# Patient Record
Sex: FEMALE | Race: WHITE | NOT HISPANIC OR LATINO | Employment: FULL TIME | URBAN - NONMETROPOLITAN AREA
[De-identification: names, ages, dates, MRNs, and addresses within clinical notes are randomized per-mention and may not be internally consistent; named-entity substitution may affect disease eponyms.]

---

## 2018-02-23 ENCOUNTER — OFFICE VISIT CONVERTED (OUTPATIENT)
Dept: FAMILY MEDICINE CLINIC | Age: 48
End: 2018-02-23
Attending: NURSE PRACTITIONER

## 2018-04-25 ENCOUNTER — OFFICE VISIT CONVERTED (OUTPATIENT)
Dept: FAMILY MEDICINE CLINIC | Age: 48
End: 2018-04-25
Attending: NURSE PRACTITIONER

## 2018-11-26 ENCOUNTER — OFFICE VISIT CONVERTED (OUTPATIENT)
Dept: FAMILY MEDICINE CLINIC | Age: 48
End: 2018-11-26
Attending: NURSE PRACTITIONER

## 2019-01-09 ENCOUNTER — OFFICE VISIT CONVERTED (OUTPATIENT)
Dept: FAMILY MEDICINE CLINIC | Age: 49
End: 2019-01-09
Attending: NURSE PRACTITIONER

## 2019-10-22 ENCOUNTER — OFFICE VISIT CONVERTED (OUTPATIENT)
Dept: FAMILY MEDICINE CLINIC | Age: 49
End: 2019-10-22
Attending: NURSE PRACTITIONER

## 2019-10-22 ENCOUNTER — HOSPITAL ENCOUNTER (OUTPATIENT)
Dept: OTHER | Facility: HOSPITAL | Age: 49
Discharge: HOME | End: 2019-10-22
Attending: NURSE PRACTITIONER

## 2019-10-22 LAB
ALBUMIN SERPL-MCNC: 4.6 G/DL (ref 3.5–5)
ALBUMIN/GLOB SERPL: 1.6 {RATIO} (ref 1.4–2.6)
ALP SERPL-CCNC: 74 U/L (ref 42–98)
ALT SERPL-CCNC: 19 U/L (ref 10–40)
ANION GAP SERPL CALC-SCNC: 20 MMOL/L (ref 8–19)
AST SERPL-CCNC: 20 U/L (ref 15–50)
BILIRUB SERPL-MCNC: 0.19 MG/DL (ref 0.2–1.3)
BUN SERPL-MCNC: 10 MG/DL (ref 5–25)
BUN/CREAT SERPL: 14 {RATIO} (ref 6–20)
CALCIUM SERPL-MCNC: 9.3 MG/DL (ref 8.7–10.4)
CHLORIDE SERPL-SCNC: 102 MMOL/L (ref 99–111)
CHOLEST SERPL-MCNC: 231 MG/DL (ref 107–200)
CHOLEST/HDLC SERPL: 3.6 {RATIO} (ref 3–6)
CONV CO2: 23 MMOL/L (ref 22–32)
CONV TOTAL PROTEIN: 7.5 G/DL (ref 6.3–8.2)
CREAT UR-MCNC: 0.72 MG/DL (ref 0.5–0.9)
GFR SERPLBLD BASED ON 1.73 SQ M-ARVRAT: >60 ML/MIN/{1.73_M2}
GLOBULIN UR ELPH-MCNC: 2.9 G/DL (ref 2–3.5)
GLUCOSE SERPL-MCNC: 93 MG/DL (ref 65–99)
HDLC SERPL-MCNC: 65 MG/DL (ref 40–60)
LDLC SERPL CALC-MCNC: 144 MG/DL (ref 70–100)
OSMOLALITY SERPL CALC.SUM OF ELEC: 291 MOSM/KG (ref 273–304)
POTASSIUM SERPL-SCNC: 4.4 MMOL/L (ref 3.5–5.3)
SODIUM SERPL-SCNC: 141 MMOL/L (ref 135–147)
TRIGL SERPL-MCNC: 112 MG/DL (ref 40–150)
VLDLC SERPL-MCNC: 22 MG/DL (ref 5–37)

## 2019-11-27 ENCOUNTER — HOSPITAL ENCOUNTER (OUTPATIENT)
Dept: OTHER | Facility: HOSPITAL | Age: 49
Discharge: HOME | End: 2019-11-27
Attending: NURSE PRACTITIONER

## 2019-11-27 ENCOUNTER — OFFICE VISIT CONVERTED (OUTPATIENT)
Dept: FAMILY MEDICINE CLINIC | Age: 49
End: 2019-11-27
Attending: NURSE PRACTITIONER

## 2019-11-29 LAB — BACTERIA SPEC AEROBE CULT: NORMAL

## 2020-02-12 ENCOUNTER — OFFICE VISIT CONVERTED (OUTPATIENT)
Dept: FAMILY MEDICINE CLINIC | Age: 50
End: 2020-02-12
Attending: NURSE PRACTITIONER

## 2020-06-24 ENCOUNTER — HOSPITAL ENCOUNTER (OUTPATIENT)
Dept: OTHER | Facility: HOSPITAL | Age: 50
Discharge: HOME | End: 2020-06-24
Attending: NURSE PRACTITIONER

## 2020-06-24 ENCOUNTER — OFFICE VISIT CONVERTED (OUTPATIENT)
Dept: FAMILY MEDICINE CLINIC | Age: 50
End: 2020-06-24
Attending: NURSE PRACTITIONER

## 2020-06-24 LAB
ALBUMIN SERPL-MCNC: 4.2 G/DL (ref 3.5–5)
ALBUMIN/GLOB SERPL: 1.8 {RATIO} (ref 1.4–2.6)
ALP SERPL-CCNC: 60 U/L (ref 42–98)
ALT SERPL-CCNC: 14 U/L (ref 10–40)
ANION GAP SERPL CALC-SCNC: 16 MMOL/L (ref 8–19)
AST SERPL-CCNC: 13 U/L (ref 15–50)
BILIRUB SERPL-MCNC: 0.22 MG/DL (ref 0.2–1.3)
BUN SERPL-MCNC: 12 MG/DL (ref 5–25)
BUN/CREAT SERPL: 17 {RATIO} (ref 6–20)
CALCIUM SERPL-MCNC: 9.1 MG/DL (ref 8.7–10.4)
CHLORIDE SERPL-SCNC: 103 MMOL/L (ref 99–111)
CONV CO2: 25 MMOL/L (ref 22–32)
CONV TOTAL PROTEIN: 6.5 G/DL (ref 6.3–8.2)
CREAT UR-MCNC: 0.72 MG/DL (ref 0.5–0.9)
GFR SERPLBLD BASED ON 1.73 SQ M-ARVRAT: >60 ML/MIN/{1.73_M2}
GLOBULIN UR ELPH-MCNC: 2.3 G/DL (ref 2–3.5)
GLUCOSE SERPL-MCNC: 115 MG/DL (ref 65–99)
OSMOLALITY SERPL CALC.SUM OF ELEC: 291 MOSM/KG (ref 273–304)
POTASSIUM SERPL-SCNC: 4.2 MMOL/L (ref 3.5–5.3)
SODIUM SERPL-SCNC: 140 MMOL/L (ref 135–147)
TSH SERPL-ACNC: 1.06 M[IU]/L (ref 0.27–4.2)

## 2020-06-29 ENCOUNTER — HOSPITAL ENCOUNTER (OUTPATIENT)
Dept: OTHER | Facility: HOSPITAL | Age: 50
Discharge: HOME | End: 2020-06-29
Attending: NURSE PRACTITIONER

## 2021-03-09 ENCOUNTER — OFFICE VISIT CONVERTED (OUTPATIENT)
Dept: FAMILY MEDICINE CLINIC | Age: 51
End: 2021-03-09
Attending: NURSE PRACTITIONER

## 2021-05-18 NOTE — PROGRESS NOTES
Kiley Valentine BARRIE 1970     Office/Outpatient Visit    Visit Date: Wed, Apr 25, 2018 04:01 pm    Provider: Pia Pedraza N.P. (Assistant: Madalyn Pedraza MA)    Location: [facility]        Electronically signed by Pia Pedraza N.P. on  04/25/2018 05:16:48 PM                             SUBJECTIVE:        CC:     Ms. Valentine is a 47 year old White female.  Patient is here for routine check up.;         HPI:         Ms. Valentine presents with hypertension.  Her current cardiac medication regimen includes an ACE inhibitor ( Zestril ).  Ms. Valentine does not check her blood pressure other than at her clinic appointments.  She is tolerating the medication well without side effects.  Compliance with treatment has been good; she takes her medication as directed, maintains her diet and exercise regimen, and follows up as directed.      ROS:     CONSTITUTIONAL:  Negative for chills, fatigue, fever, and weight change.      CARDIOVASCULAR:  Negative for chest pain, palpitations, tachycardia, orthopnea, and edema.      RESPIRATORY:  Negative for cough, dyspnea, and hemoptysis.      NEUROLOGICAL:  Negative for dizziness, headaches, paresthesias, and weakness.          PM/FM/SH:     Last Reviewed on 4/25/2018 04:36 PM by Pia Pedraza    Past Medical History:                 PAST MEDICAL HISTORY             GYNECOLOGICAL HISTORY:    Contraception: S/P tubal ligation;         Surgical History:         Tubal Ligation Procedures: Prior gynecologic procedures include LEEP procedure 2000.      Hysterectomy: 8-9-16; lap hysterectomy, bilateral tubes/cystoscopy /benign/fibroids;     Procedures: hysteroscopy/endometrial biopsy 4-2016         Family History:         Positive for Hypertension ( father; pat. GM ).      Positive for COPD ( pat. GF ).  Father:    Positive for Hypertension;     Mother:    Positive for Hypertension;     ; Positive for Type 2 Diabetes;     Paternal Grandfather: Cause of death was COPD      Paternal Grandmother:    Positive for Hypertension;     Maternal Grandfather: Medical history unknown     Maternal Grandmother: Medical history unknown Father: Hypertension;  Cerebrovascular Accident     Mother: Hypertension;  Type 2 Diabetes     Paternal Grandfather: COPD     Paternal Grandmother: Hypertension         Social History:     Occupation: [employer]     Marital Status:      Children: 3 children     Exercise: Primary form of exercise is walking.   Frequency is 5 days per week.          Tobacco/Alcohol/Supplements:     Last Reviewed on 4/25/2018 04:04 PM by Madalyn Pedraza    Tobacco: She has a past history of cigarette smoking; quit date:  2007.          Alcohol: Frequency:    was drinking daily but quit 7-2015;     Caffeine:  She admits to consuming caffeine via coffee ( 1 serving per day ).          Substance Abuse History:     Last Reviewed on 2/18/2016 04:51 PM by Efrain Barkley    NEGATIVE             Immunizations:     None        Allergies:     Last Reviewed on 4/25/2018 04:01 PM by Madalyn Pedraza      No Known Drug Allergies.         Current Medications:     Last Reviewed on 4/25/2018 04:01 PM by Madalyn Pedraza    Lisinopril 10mg Tablet 1 in am     Albuterol 90mcg/1actuation Oral Inhaler Inhale 1-2 puff(s) q 4 to 6 hr         OBJECTIVE:        Vitals:         Current: 4/25/2018 4:03:00 PM    Ht:  5 ft, 2.5 in;  Wt: 162.9 lbs;  BMI: 29.3    T: 99 F (oral);  BP: 135/79 mm Hg (left arm, sitting);  P: 62 bpm (left arm (BP Cuff), sitting);  sCr: 0.62 mg/dL;  GFR: 114.15        Exams:     PHYSICAL EXAM:     GENERAL: vital signs recorded - well developed, well nourished;  no apparent distress;     NECK: carotid exam reveals no bruits;     RESPIRATORY: normal respiratory rate and pattern with no distress; normal breath sounds with no rales, rhonchi, wheezes or rubs;     CARDIOVASCULAR: normal rate; rhythm is regular;  no systolic murmur; no edema;     PSYCHIATRIC:  appropriate affect and  demeanor; normal speech pattern; grossly normal memory;         ASSESSMENT           401.1   I10  Hypertension              DDx:         ORDERS:         Meds Prescribed:       Refill of: Lisinopril 10mg Tablet 1 in am  #90 (Ninety) tablet(s) Refills: 1         Lab Orders:       FUTURE  Future order to be done at patients convenience  (Send-Out)         [id number]  [facility] CMP AND LIPID: [id number], [id number]  (Send-Out)                   PLAN:          Hypertension reviewed labs and last lipids were 2015         FOLLOW-UP TESTING #1: FOLLOW-UP LABORATORY:  Labs to be scheduled in the future include HTN/Lipid Panel: CMP, Lipid.      RECOMMENDATIONS given include: perform routine monitoring of blood pressure with home blood pressure cuff and reduction of dietary salt intake.      FOLLOW-UP: fasting for labs           Prescriptions:       Refill of: Lisinopril 10mg Tablet 1 in am  #90 (Ninety) tablet(s) Refills: 1           Orders:       FUTURE  Future order to be done at patients convenience  (Send-Out)         [id number]  [facility] CMP AND LIPID: [id number], [id number]  (Send-Out)             Patient Education Handouts:       Hyperlipidemia (Hyperlipoproteinemia)        Potassium Imbalance              Patient Recommendations:        For  Hypertension:             The following laboratory testing has been ordered: Begin monitoring your blood pressure by brief nurse visits at our office, a home blood pressure monitor, or by checking on the machines in pharmacies or stores.  Keep a log of the readings. Reduce the amount of salt in your diet.              CHARGE CAPTURE           **Please note: ICD descriptions below are intended for billing purposes only and may not represent clinical diagnoses**        Primary Diagnosis:         401.1 Hypertension            I10    Essential (primary) hypertension              Orders:          84664   Office/outpatient visit; established patient, level 3  (In-House)

## 2021-05-18 NOTE — PROGRESS NOTES
"Kiley Valentine BARRIE 1970     Office/Outpatient Visit    Visit Date: Fri, Feb 23, 2018 11:27 am    Provider: Liana Pickett N.P. (Assistant: Sosa Tang MA)    Location: [facility]        Electronically signed by Liana Pickett N.P. on  02/24/2018 08:06:21 PM                             SUBJECTIVE:        CC:     Ms. Valentine is a 47 year old White female.  Upper Respiratory Illness;         HPI:         Upper respiratory illness noted.  These have been present for the past 4 weeks.  The symptoms include progressive, productive cough, \"sinus\" headache, nasal congestion, purulent nasal discharge and wheezing.  She denies fever.  She reports recent exposure to illness from co-workers.  She has already tried to relieve the symptoms with mucinex, albuterol.      ROS:     CONSTITUTIONAL:  Positive for fatigue.   Negative for fever.      E/N/T:  Positive for nasal congestion and frequent rhinorrhea.      CARDIOVASCULAR:  Negative for chest pain, palpitations, tachycardia, orthopnea, and edema.      RESPIRATORY:  Positive for recent cough ( with scant amounts of purulent sputum ) and frequent wheezing.      GASTROINTESTINAL:  Negative for abdominal pain, heartburn, constipation, diarrhea, and stool changes.      MUSCULOSKELETAL:  Negative for arthralgias, back pain, and myalgias.      NEUROLOGICAL:  Positive for headaches ( \"sinus\" ).   Negative for dizziness or paresthesias.          PMH/FMH/SH:     Last Reviewed on 9/07/2017 12:23 PM by Pia Pedraza    Past Medical History:                 PAST MEDICAL HISTORY             GYNECOLOGICAL HISTORY:    Contraception: S/P tubal ligation;         Surgical History:         Tubal Ligation Procedures: Prior gynecologic procedures include LEEP procedure 2000.      Hysterectomy: 8-9-16; lap hysterectomy, bilateral tubes/cystoscopy /benign/fibroids;     Procedures: hysteroscopy/endometrial biopsy 4-2016         Family History:         Positive for Hypertension ( " father; pat. GM ).      Positive for COPD ( pat. GF ).  Father:    Positive for Hypertension;     Mother:    Positive for Hypertension;     ; Positive for Type 2 Diabetes;     Paternal Grandfather: Cause of death was COPD     Paternal Grandmother:    Positive for Hypertension;     Maternal Grandfather: Medical history unknown     Maternal Grandmother: Medical history unknown Father: Hypertension     Mother: Hypertension;  Type 2 Diabetes     Paternal Grandfather: COPD     Paternal Grandmother: Hypertension         Social History:     Occupation: [employer] supervisor     Marital Status:      Children: 3 children     Exercise: Primary form of exercise is walking.   Frequency is 5 days per week.          Tobacco/Alcohol/Supplements:     Last Reviewed on 9/07/2017 11:57 AM by Madalyn Potts    Tobacco: She has a past history of cigarette smoking; quit date:  2007.          Alcohol: Frequency:    was drinking daily but quit 7-2015;     Caffeine:  She admits to consuming caffeine via coffee ( 1 serving per day ).          Substance Abuse History:     Last Reviewed on 2/18/2016 04:51 PM by Efrain Barkley    NEGATIVE             Current Problems:     Last Reviewed on 2/18/2016 04:52 PM by Efrain Barkley    Depression with anxiety     Hypertension     Breast mass     Asthma         Immunizations:     None        Allergies:     Last Reviewed on 9/07/2017 11:57 AM by Madalyn Pedraza      No Known Drug Allergies.         Current Medications:     Last Reviewed on 2/23/2018 11:29 AM by oSsa Tang    Lisinopril 10mg Tablet 1 in am     Lexapro 10mg Tablet 1 tab qd         OBJECTIVE:        Vitals:         Historical:     09/07/2017  BP:   110/67 mm Hg ( (left arm, , sitting, );)     09/07/2017  Wt:   160.6lbs        Current: 2/23/2018 11:28:54 AM    Ht:  5 ft, 2.5 in;  Wt: 171 lbs;  BMI: 30.8    T: 98.3 F (oral);  BP: 152/90 mm Hg (left arm, sitting);  P: 82 bpm (left arm (BP Cuff),  sitting);  sCr: 0.62 mg/dL;  GFR: 116.52        Repeat:     11:57:44 AM     BP:   154/98mm Hg (left arm, sitting)         Exams:     PHYSICAL EXAM:     GENERAL:  well developed and nourished; appropriately groomed; in no apparent distress;     E/N/T: EARS: bilateral TMs are normal;  NOSE: nasal mucosa is erythematous;  OROPHARYNX: posterior pharynx, including tonsils, tongue, and uvula are normal;     RESPIRATORY: normal respiratory rate and pattern with no distress; expiratory wheezes in the NITA and LLL;     CARDIOVASCULAR: normal rate; rhythm is regular;     LYMPHATIC: no enlargement of cervical or facial nodes;     MUSCULOSKELETAL:  Normal range of motion, strength and tone;     NEUROLOGIC: mental status: alert and oriented x 3; GROSSLY INTACT     PSYCHIATRIC:  appropriate affect and demeanor; normal speech pattern; grossly normal memory;         Lab/Test Results:             Influenza A and B:  Negative (02/23/2018),     Performed by::  atc (02/23/2018),             ASSESSMENT           466.0   J20.9  Acute bronchitis              DDx:         ORDERS:         Meds Prescribed:       Prednisone 5mg Tablet 8 pills day 1, 6 pills day 2, 4 pills on day three, 2 pills on day 4, and 1 pill on day 5  #21 (Twenty One) tablet(s) Refills: 0       Augmentin (Amoxicillin/Clavulanate) 875mg/125mg Tablet 1 po bid with food  #20 (Twenty) tablet(s) Refills: 0       Tessalon Perles (Benzonatate) 100mg Capsules Take 1 capsule bid prn cough  #30 (Thirty) capsule(s) Refills: 0         Lab Orders:       [id number]  Infectious agent antigen detection by immunoassay; Influenza  (In-House)         95334-58  Infectious agent antigen detection by immunoassay; Influenza  (In-House)                   PLAN:          Acute bronchitis         RECOMMENDATIONS given include: rest, increase oral fluid intake, and tessalon may cause drowsiness.  follow up if not improving..            Prescriptions:       Prednisone 5mg Tablet 8 pills day 1, 6 pills  day 2, 4 pills on day three, 2 pills on day 4, and 1 pill on day 5  #21 (Twenty One) tablet(s) Refills: 0       Augmentin (Amoxicillin/Clavulanate) 875mg/125mg Tablet 1 po bid with food  #20 (Twenty) tablet(s) Refills: 0       Tessalon Perles (Benzonatate) 100mg Capsules Take 1 capsule bid prn cough  #30 (Thirty) capsule(s) Refills: 0           Orders:       [id number]  Infectious agent antigen detection by immunoassay; Influenza  (In-House)         [id number]  Infectious agent antigen detection by immunoassay; Influenza  (In-House)               CHARGE CAPTURE           **Please note: ICD descriptions below are intended for billing purposes only and may not represent clinical diagnoses**        Primary Diagnosis:         466.0 Acute bronchitis            J20.9    Acute bronchitis, unspecified              Orders:          10047   Office/outpatient visit; established patient, level 3  (In-House)             59323   Infectious agent antigen detection by immunoassay; Influenza  (In-House)             48930 -59  Infectious agent antigen detection by immunoassay; Influenza  (In-House)

## 2021-05-18 NOTE — PROGRESS NOTES
Kiley Valentine BARRIE 1970     Office/Outpatient Visit    Visit Date: Mon, Nov 26, 2018 12:46 pm    Provider: Pia Pedraza N.P. (Assistant: Leonid Mcclure)    Location: [facility]        Electronically signed by Pia Pedraza N.P. on  11/26/2018 01:29:21 PM                             SUBJECTIVE:        CC:     Ms. Valentine is a 47 year old White female.  This is a follow-up visit.  meds refills;         HPI:         Ms. Valentine presents with hypertension.  Her current cardiac medication regimen includes an ACE inhibitor ( Prinivil ).  She is tolerating the medication well without side effects.  Compliance with treatment has been good; she takes her medication as directed, maintains her diet and exercise regimen, and follows up as directed.      ROS:     CONSTITUTIONAL:  Negative for chills, fatigue, fever, and weight change.      CARDIOVASCULAR:  Negative for chest pain, palpitations, tachycardia, orthopnea, and edema.      RESPIRATORY:  Negative for cough, dyspnea, and hemoptysis.      NEUROLOGICAL:  Negative for dizziness, headaches, paresthesias, and weakness.          [facility]/:     Last Reviewed on 11/26/2018 01:02 PM by Pia Pedraza    Past Medical History:                 PAST MEDICAL HISTORY             GYNECOLOGICAL HISTORY:    Contraception: S/P tubal ligation;         Surgical History:         Tubal Ligation Procedures: Prior gynecologic procedures include LEEP procedure 2000.      Hysterectomy: 8-9-16; lap hysterectomy, bilateral tubes/cystoscopy /benign/fibroids;     Procedures: hysteroscopy/endometrial biopsy 4-2016         Family History:         Positive for Hypertension ( father; pat. GM ).      Positive for COPD ( pat. GF ).  Father:    Positive for Hypertension;     Mother:    Positive for Hypertension;     ; Positive for Type 2 Diabetes;     Paternal Grandfather: Cause of death was COPD     Paternal Grandmother:    Positive for Hypertension;     Maternal Grandfather: Medical  history unknown     Maternal Grandmother: Medical history unknown Father: Hypertension;  Cerebrovascular Accident     Mother: Hypertension;  Type 2 Diabetes     Paternal Grandfather: COPD     Paternal Grandmother: Hypertension         Social History:     Occupation: [employer]     Marital Status:      Children: 3 children     Exercise: Primary form of exercise is walking.   Frequency is 5 days per week.          Tobacco/Alcohol/Supplements:     Last Reviewed on 11/26/2018 12:49 PM by Leonid Mcclure    Tobacco: She has a past history of cigarette smoking; quit date:  2007.          Alcohol: Frequency:    was drinking daily but quit 7-2015;     Caffeine:  She admits to consuming caffeine via coffee ( 1 serving per day ).          Substance Abuse History:     Last Reviewed on 2/18/2016 04:51 PM by Efrain Barkley    NEGATIVE             Immunizations:     VAQTA -adult dose (HepA) 5/5/2018         Allergies:     Last Reviewed on 11/26/2018 12:48 PM by Leonid Mcclure      No Known Drug Allergies.         Current Medications:     Last Reviewed on 11/26/2018 12:49 PM by Leonid Mcclure    Lisinopril 10mg Tablet 1 in am     Albuterol 90mcg/1actuation Oral Inhaler Inhale 1-2 puff(s) q 4 to 6 hr         OBJECTIVE:        Vitals:         Current: 11/26/2018 12:50:55 PM    Ht:  5 ft, 2.5 in;  Wt: 163.2 lbs;  BMI: 29.4    T: 97.7 F (oral);  BP: 124/73 mm Hg (right arm, sitting);  P: 74 bpm (right arm (BP Cuff), sitting);  sCr: 0.62 mg/dL;  GFR: 114.23        Exams:     PHYSICAL EXAM:     GENERAL: vital signs recorded - well developed, well nourished;  no apparent distress;     NECK: carotid exam reveals no bruits;     RESPIRATORY: normal respiratory rate and pattern with no distress; normal breath sounds with no rales, rhonchi, wheezes or rubs;     CARDIOVASCULAR: normal rate; rhythm is regular;  no systolic murmur; no edema;     PSYCHIATRIC:  appropriate affect and demeanor; normal speech pattern; grossly normal  memory;         ASSESSMENT           401.1   I10  Hypertension              DDx:         ORDERS:         Meds Prescribed:       Refill of: Lisinopril (Lisinopril)  10mg Tablet 1 in am  #90 (Ninety) tablet(s) Refills: 0         Lab Orders:       FUTURE  Future order to be done at patients convenience  (Send-Out)         [id number]  [facility] CMP AND LIPID: [id number]  (Send-Out)                   PLAN:          Hypertension has had 2 hep a's and flu vaccines         FOLLOW-UP:.   for fasting for labs     FOLLOW-UP TESTING #1: FOLLOW-UP LABORATORY:  Labs to be scheduled in the future include HTN/Lipid Panel: CMP, Lipid.      RECOMMENDATIONS given include: exercise, reduction of dietary salt intake, and regular exercise.            Prescriptions:       Refill of: Lisinopril (Lisinopril)  10mg Tablet 1 in am  #90 (Ninety) tablet(s) Refills: 0           Orders:       FUTURE  Future order to be done at patients convenience  (Send-Out)         [id number]  [facility] CMP AND LIPID: [id number]  (Send-Out)             Patient Education Handouts:       Cholesterol Screening              Patient Recommendations:        For  Hypertension:                     APPOINTMENT INFORMATION:        Monday Tuesday Wednesday Thursday Friday Saturday Sunday            Time:___________________AM  PM   Date:_____________________         The following laboratory testing has been ordered: Maintain a regular exercise program. Reduce the amount of salt in your diet.              CHARGE CAPTURE           **Please note: ICD descriptions below are intended for billing purposes only and may not represent clinical diagnoses**        Primary Diagnosis:         401.1 Hypertension            I10    Essential (primary) hypertension              Orders:          [id number]   Office/outpatient visit; established patient, level 3  (In-House)

## 2021-05-18 NOTE — PROGRESS NOTES
Kiley Valentine  1970     Office/Outpatient Visit    Visit Date: Tue, Mar 9, 2021 02:11 pm    Provider: Pia Pedraza N.P. (Assistant: Valery Young MA)    Location: [facility]        Electronically signed by Pia Pedraza N.P. on  03/09/2021 03:14:10 PM                             Subjective:        CC: No longer taking SINGULAIR and VENTOLINMs. Meme is a 50 year old White female.  This is a follow-up visit.          HPI:           Ms. Valentine presents with essential (primary) hypertension.  Her current cardiac medication regimen includes an ACE inhibitor ( Prinivil ).  She did not bring her blood pressure diary, but says that typical readings show systolics in the 120-140 range and diastolics in the 70-80 range.  She is tolerating the medication well without side effects.      ROS:     CONSTITUTIONAL:  Negative for fever.      CARDIOVASCULAR:  Negative for chest pain, palpitations, tachycardia, orthopnea, and edema.      RESPIRATORY:  Positive for wheezing with exercise, usese alb inhaler prn, does not a refill today.   Negative for recent cough or dyspnea.      NEUROLOGICAL:  Negative for dizziness, headaches, paresthesias, and weakness.          Past Medical History / Family History / Social History:         Last Reviewed on 3/09/2021 02:37 PM by Pia Pedraza    Past Medical History:                 PAST MEDICAL HISTORY             GYNECOLOGICAL HISTORY:    Contraception: S/P tubal ligation; Hospitalizations:    Asthma admitted once on 2002         Surgical History:         Tubal Ligation Procedures: Prior gynecologic procedures include LEEP procedure 2000.      Hysterectomy: 8-9-16; lap hysterectomy, bilateral tubes/cystoscopy /benign/fibroids;     Procedures: hysteroscopy/endometrial biopsy 4-2016         Family History:     Father: Hypertension;  Cerebrovascular Accident     Mother: Hypertension;  Type 2 Diabetes     Brother(s): 0 brother(s) total     Sister(s): 0  sister(s) total     Paternal Grandfather: COPD     Paternal Grandmother: Hypertension         Social History:     Occupation: [employer]      Marital Status:      Children: 3 children     Exercise: Primary form of exercise is walking.   Frequency is 5 days per week.          Tobacco/Alcohol/Supplements:     Last Reviewed on 3/09/2021 02:21 PM by Valery Young    Tobacco: She has a past history of cigarette smoking; quit date:  2007.          Alcohol: Frequency:    was drinking daily but quit 7-2015;     Caffeine:  She admits to consuming caffeine via coffee ( 1 serving per day ).          Substance Abuse History:     Last Reviewed on 1/09/2019 02:02 PM by Madalyn Pedraza    NEGATIVE         Mental Health History:     Last Reviewed on 1/09/2019 02:02 PM by Madalyn Pedraza        Communicable Diseases (eg STDs):     Last Reviewed on 1/09/2019 02:02 PM by Madalyn Pedraza        Immunizations:     Hep A, adult dose 11/12/2018    VAQTA -adult dose (HepA) 5/5/2018    Fluzone Quadrivalent (3+ years) 11/19/2018    Fluzone Quadrivalent (3+ years) 10/22/2019        Allergies:     Last Reviewed on 3/09/2021 02:21 PM by Valery Young    No Known Allergies.        Current Medications:     Last Reviewed on 3/09/2021 02:21 PM by Valery Young    Ventolin HFA 90 mcg/actuation Inhalation HFA Aerosol Inhaler [INHALE 1 TO 2 PUFFS BY MOUTH EVERY 4 HOURS AS NEEDED]    albuterol sulfate 90 mcg/actuation Inhalation HFA Aerosol Inhaler [INHALE 1 TO 2 PUFFS BY MOUTH EVERY 4 HOURS AS NEEDED]    lisinopriL 10 mg oral tablet [TAKE 1 TABLET BY MOUTH IN THE MORNING]    Singulair 10 mg oral tablet [take 1 tablet (10 mg) by oral route once daily in the evening]        Objective:        Vitals:         Current: 3/9/2021 2:20:33 PM    Ht:  5 ft, 2.5 in;  Wt: 169.2 lbs;  BMI: 30.5T: 96.4 F (temporal);  BP: 139/80 mm Hg (right arm, sitting);  P: 70 bpm (right arm (BP Cuff), sitting);  sCr: 0.72 mg/dL;   GFR: 96.78        Exams:     PHYSICAL EXAM:     GENERAL: vital signs recorded - well developed, well nourished;  no apparent distress;     NECK: carotid exam reveals no bruits;     RESPIRATORY: normal respiratory rate and pattern with no distress; normal breath sounds with no rales, rhonchi, wheezes or rubs;     CARDIOVASCULAR: normal rate; rhythm is regular;  no systolic murmur; no edema;     PSYCHIATRIC:  appropriate affect and demeanor; normal speech pattern; grossly normal memory;         Assessment:         I10   Essential (primary) hypertension           ORDERS:         Meds Prescribed:       [Refilled] lisinopriL 10 mg oral tablet [TAKE 1 TABLET BY MOUTH IN THE MORNING], #90 (ninety) tablets, Refills: 1 (one)         Lab Orders:       FUTURE  Future order to be done at patients convenience  (Send-Out)            [id number]  [facility] CMP AND LIPID: [id number], [id number]  (Send-Out)                      Plan:         Essential (primary) hypertensiongoing to gym and eating healthier         FOLLOW-UP TESTING #1: FOLLOW-UP LABORATORY:  Labs to be scheduled in the future include HTN/Lipid Panel: CMP, Lipid.      RECOMMENDATIONS given include: perform routine monitoring of blood pressure with home blood pressure cuff, exercise, and reduction of dietary salt intake.      FOLLOW-UP: fasting for labs           Prescriptions:       [Refilled] lisinopriL 10 mg oral tablet [TAKE 1 TABLET BY MOUTH IN THE MORNING], #90 (ninety) tablets, Refills: 1 (one)           Orders:       FUTURE  Future order to be done at patients convenience  (Send-Out)            [id number]  [facility] CMP AND LIPID: [id number]  (Send-Out)                  Patient Recommendations:        For  Essential (primary) hypertension:            The following laboratory testing has been ordered: Begin monitoring your blood pressure by brief nurse visits at our office, a home blood pressure monitor, or by checking on the machines in pharmacies or stores.  Keep a log of  the readings. Maintain a regular exercise program. Reduce the amount of salt in your diet.              Charge Capture:         Primary Diagnosis:     I10  Essential (primary) hypertension           Orders:      38448  Office/outpatient visit; established patient, level 3  (In-House)

## 2021-05-18 NOTE — PROGRESS NOTES
Kiley Valentine BARRIE 1970     Office/Outpatient Visit    Visit Date: Wed, Jan 9, 2019 02:01 pm    Provider: Pia Pedraza N.P. (Assistant: Madalyn Pedraza MA)    Location: [facility]        Electronically signed by Pia Pedraza N.P. on  01/09/2019 02:54:47 PM                             SUBJECTIVE:        CC:     Ms. Valentine is a 48 year old White female.  Patient is here for results of lab work.;         HPI:         Patient to be evaluated for hypertension.  Her current cardiac medication regimen includes an ACE inhibitor ( Zestril ).  She is tolerating the medication well without side effects.  Compliance with treatment has been good; she takes her medication as directed.          In regard to the hypercholesterolemia, current treatment includes a low cholesterol/low fat diet.  Compliance with treatment has been good; she maintains her exercise regimen.  Most recent lab tests include Total Cholesterol:  213 (mg/dL) (12/17/2018), HDL:  63 (mg/dL) (12/17/2018), Triglycerides:  116 (mg/dL) (12/17/2018), LDL:  127 (mg/dL) (12/17/2018), Glucose, Serum:  121 (mg/dL) (12/17/2018).      ROS:     CONSTITUTIONAL:  Positive for unintentional weight gain.      CARDIOVASCULAR:  Negative for chest pain, palpitations, tachycardia, orthopnea, and edema.      RESPIRATORY:  Negative for cough, dyspnea, and hemoptysis.      NEUROLOGICAL:  Negative for dizziness, headaches, paresthesias, and weakness.          PMH/FMH/SH:     Last Reviewed on 1/09/2019 02:52 PM by Pia Pedraza    Past Medical History:                 PAST MEDICAL HISTORY             GYNECOLOGICAL HISTORY:    Contraception: S/P tubal ligation;         Surgical History:         Tubal Ligation Procedures: Prior gynecologic procedures include LEEP procedure 2000.      Hysterectomy: 8-9-16; lap hysterectomy, bilateral tubes/cystoscopy /benign/fibroids;     Procedures: hysteroscopy/endometrial biopsy 4-2016         Family History:     Father:  Hypertension;  Cerebrovascular Accident     Mother: Hypertension;  Type 2 Diabetes     Brother(s): 0 brother(s) total     Sister(s): 0 sister(s) total     Paternal Grandfather: COPD     Paternal Grandmother: Hypertension         Social History:     Occupation: [employer]     Marital Status:      Children: 3 children     Exercise: Primary form of exercise is walking.   Frequency is 5 days per week.          Tobacco/Alcohol/Supplements:     Last Reviewed on 1/09/2019 02:07 PM by Madalyn Pedraza    Tobacco: She has a past history of cigarette smoking; quit date:  2007.          Alcohol: Frequency:    was drinking daily but quit 7-2015;     Caffeine:  She admits to consuming caffeine via coffee ( 1 serving per day ).          Substance Abuse History:     Last Reviewed on 1/09/2019 02:02 PM by Madalyn Pedraza    NEGATIVE         Mental Health History:     Last Reviewed on 1/09/2019 02:02 PM by Madalyn Pedraza        Communicable Diseases (eg STDs):     Last Reviewed on 1/09/2019 02:02 PM by Madalyn Pedraza            Immunizations:     Hep A, adult dose 11/12/2018     VAQTA -adult dose (HepA) 5/5/2018     Fluzone Quadrivalent (3+ years) 11/19/2018         Allergies:     Last Reviewed on 1/09/2019 02:06 PM by Madalyn Pedraza      No Known Drug Allergies.         Current Medications:     Last Reviewed on 1/09/2019 02:06 PM by Madalyn Pedraza    Albuterol 90mcg/1actuation Oral Inhaler Inhale 1-2 puff(s) q 4 to 6 hr     Lisinopril 10mg Tablet 1 in am         OBJECTIVE:        Vitals:         Current: 1/9/2019 2:06:00 PM    Ht:  5 ft, 2.5 in;  Wt: 167 lbs;  BMI: 30.1    T: 98.3 F (oral);  BP: 120/71 mm Hg (left arm, sitting);  P: 64 bpm (left arm (BP Cuff), sitting);  sCr: 0.92 mg/dL;  GFR: 76.94        Exams:     PHYSICAL EXAM:     GENERAL: vital signs recorded - well developed, well nourished;  no apparent distress;     NECK: carotid exam reveals no bruits;     RESPIRATORY: normal respiratory rate and pattern with  no distress; normal breath sounds with no rales, rhonchi, wheezes or rubs;     CARDIOVASCULAR: normal rate; rhythm is regular;  no systolic murmur; no edema;     PSYCHIATRIC:  appropriate affect and demeanor; normal speech pattern; grossly normal memory;         ASSESSMENT           401.1   I10  Hypertension              DDx:     272.0   E78.2  Hypercholesterolemia              DDx:     783.1   R63.5  Abnormal weight gain              DDx:         ORDERS:         Lab Orders:       FUTURE  Future order to be done at patients convenience  (Send-Out)         09754  [facility] - [facility] Hemoglobin A1C  (Send-Out)         30420  [facility] - [facility] CMP AND LIPID: [id number], [id number]  (Send-Out)         FUTURE  Future order to be done at patients convenience  (Send-Out)         50218  TSH - [facility] TSH  (Send-Out)           Other Orders:         Calculated BMI above the upper parameter and a follow-up plan was documented in the medical record  (In-House)                   PLAN:          Hypertension reviewed recent labs with patient and her vital sign flow sheet, copy of labs to patient         RECOMMENDATIONS given include: continue current rx.  MIPS     BMI Elevated - Follow-Up Plan: She was provided education on weight loss strategies           Orders:         Calculated BMI above the upper parameter and a follow-up plan was documented in the medical record  (In-House)            Hypercholesterolemia         FOLLOW-UP TESTING #1: FOLLOW-UP LABORATORY:  Labs to be scheduled in the future include HgbA1C and HTN/Lipid Panel: CMP, Lipid.      RECOMMENDATIONS given include: exercise, low cholesterol/low fat diet, and weight loss.      FOLLOW-UP: fasting for labs in 6-8 weeks           Orders:       FUTURE  Future order to be done at patients convenience  (Send-Out)         56563  [facility] - [facility] Hemoglobin A1C  (Send-Out)         58623  [facility] - [facility] CMP AND LIPID: [id number], [id number]  (Send-Out)             Patient Education Handouts:       Cholesterol  Screening           Abnormal weight gain         FOLLOW-UP TESTING #1: FOLLOW-UP LABORATORY:  Labs to be scheduled in the future include TSH.            Orders:       FUTURE  Future order to be done at patients convenience  (Send-Out)         40462  [facility] TSH  (Send-Out)               Patient Recommendations:        For  Hypercholesterolemia:             The following laboratory testing has been ordered: HgbA1C Maintain a regular exercise program. Reduce the amount of cholesterol and saturated fat in your diet. Try to lose some weight; even modest weight reduction can improve your blood pressure.          For  Abnormal weight gain:             The following laboratory testing has been ordered: TSH             CHARGE CAPTURE           **Please note: ICD descriptions below are intended for billing purposes only and may not represent clinical diagnoses**        Primary Diagnosis:         401.1 Hypertension            I10    Essential (primary) hypertension              Orders:          22111   Office/outpatient visit; established patient, level 4  (In-House)                Calculated BMI above the upper parameter and a follow-up plan was documented in the medical record  (In-House)           272.0 Hypercholesterolemia            E78.2    Mixed hyperlipidemia    783.1 Abnormal weight gain            R63.5    Abnormal weight gain

## 2021-05-18 NOTE — PROGRESS NOTES
Kiley Valentine BARRIE 1970     Office/Outpatient Visit    Visit Date: Tue, Oct 22, 2019 08:27 am    Provider: Pia Pedraza N.P. (Assistant: Annabelle Emanuel MA)    Location: [facility]        Electronically signed by Pia Pedraza N.P. on  10/22/2019 09:28:01 AM                             SUBJECTIVE:        CC:     Ms. Valentine is a 48 year old White female.  This is a follow-up visit.  med refill;         HPI:         Patient presents with hypertension.  Her current cardiac medication regimen includes an ACE inhibitor ( Zestril ).  Ms. Valentine does not check her blood pressure other than at her clinic appointments.  She is tolerating the medication well without side effects.  Compliance with treatment has been good; she takes her medication as directed.      ROS:     CONSTITUTIONAL:  Negative for chills, fatigue, fever, and weight change.      CARDIOVASCULAR:  Negative for chest pain, palpitations, tachycardia, orthopnea, and edema.      RESPIRATORY:  Negative for cough, dyspnea, and hemoptysis.      MUSCULOSKELETAL:  Positive for right arm pain.      NEUROLOGICAL:  Negative for dizziness, headaches, paresthesias, and weakness.      PSYCHIATRIC:  Positive for feelings of stress ( (work stressful) ).          PMH/FMH/SH:     Last Reviewed on 10/22/2019 08:42 AM by Pia Pedraza    Past Medical History:                 PAST MEDICAL HISTORY             GYNECOLOGICAL HISTORY:    Contraception: S/P tubal ligation;         Surgical History:         Tubal Ligation Procedures: Prior gynecologic procedures include LEEP procedure 2000.      Hysterectomy: 8-9-16; lap hysterectomy, bilateral tubes/cystoscopy /benign/fibroids;     Procedures: hysteroscopy/endometrial biopsy 4-2016         Family History:     Father: Hypertension;  Cerebrovascular Accident     Mother: Hypertension;  Type 2 Diabetes     Brother(s): 0 brother(s) total     Sister(s): 0 sister(s) total     Paternal Grandfather: COPD     Paternal  Grandmother: Hypertension         Social History:     Occupation: [employer]      Marital Status:      Children: 3 children     Exercise: Primary form of exercise is walking.   Frequency is 5 days per week.          Tobacco/Alcohol/Supplements:     Last Reviewed on 10/22/2019 08:30 AM by Annabelle Emanuel    Tobacco: She has a past history of cigarette smoking; quit date:  2007.          Alcohol: Frequency:    was drinking daily but quit 7-2015;     Caffeine:  She admits to consuming caffeine via coffee ( 1 serving per day ).          Substance Abuse History:     Last Reviewed on 1/09/2019 02:02 PM by Madalyn Pedraza    NEGATIVE         Mental Health History:     Last Reviewed on 1/09/2019 02:02 PM by Madalyn Pedraza        Communicable Diseases (eg STDs):     Last Reviewed on 1/09/2019 02:02 PM by Madalyn Pedraza            Immunizations:     Hep A, adult dose 11/12/2018     VAQTA -adult dose (HepA) 5/5/2018     Fluzone Quadrivalent (3+ years) 11/19/2018         Allergies:     Last Reviewed on 10/22/2019 08:30 AM by Annabelle Emanuel      No Known Drug Allergies.         Current Medications:     Last Reviewed on 10/22/2019 08:30 AM by Annabelle Emanuel    Lisinopril 10mg Tablet 1 in am     Albuterol 90mcg/1actuation Oral Inhaler Inhale 1-2 puff(s) q 4 to 6 hr         OBJECTIVE:        Vitals:         Current: 10/22/2019 8:32:08 AM    Ht:  5 ft, 2.5 in;  Wt: 166 lbs;  BMI: 29.9    T: 98.5 F (oral);  BP: 135/88 mm Hg (left arm, sitting);  P: 69 bpm (left arm (BP Cuff), sitting);  sCr: 0.92 mg/dL;  GFR: 76.74        Exams:     PHYSICAL EXAM:     GENERAL: vital signs recorded - well developed, well nourished;  no apparent distress;     NECK: carotid exam reveals no bruits;     RESPIRATORY: normal respiratory rate and pattern with no distress; normal breath sounds with no rales, rhonchi, wheezes or rubs;     CARDIOVASCULAR: normal rate; rhythm is regular;  no systolic murmur; no  edema;     MUSCULOSKELETAL: full ROM of right arm/ wrist, no pain, negative phalen's sign     PSYCHIATRIC:  appropriate affect and demeanor; normal speech pattern; grossly normal memory;         Procedures:     Vaccination against other viral diseases, Influenza     1. Influenza, seasonal PF (children 3 years to adult): 0.5 ml unit dose given IM in the right upper arm; administered by pdr 10/22/19;  lot number [id number]; expires 6/30/20 Regarding contraindications to an Influenza vaccine:        No contraindications were noted.              ASSESSMENT           401.1   I10  Hypertension              DDx:     729.5   M79.601  Arm pain              DDx:     V04.81   Z23  Vaccination against other viral diseases, Influenza              DDx:         ORDERS:         Meds Prescribed:       Refill of: Lisinopril 10mg Tablet 1 in am  #90 (Ninety) tablet(s) Refills: 1         Lab Orders:       [id number]  [facility] CMP AND LIPID: [id number], [id number]  (Send-Out)           Procedures Ordered:       70477  Immunization administration; one vaccine  (In-House)           Other Orders:       86747  Influenza virus vaccine, quadrivalent, split virus, preservative free 3 years of age & older  (In-House)                   PLAN:          Hypertension had coffee with cream and sugar /get flu vaccine this am     LABORATORY:  Labs ordered to be performed today include HTN/Lipid Panel: CMP, Lipid.      RECOMMENDATIONS given include: perform routine monitoring of blood pressure with home blood pressure cuff, exercise, reduction of dietary salt intake, stress reduction, and Advised about free BP checks on the last Saturday of each month.      FOLLOW-UP: Schedule a follow-up visit in 6 months.            Prescriptions:       Refill of: Lisinopril 10mg Tablet 1 in am  #90 (Ninety) tablet(s) Refills: 1           Orders:       67540  [facility] CMP AND LIPID: [id number]  (Send-Out)            Arm pain can try work place adjustment's, she is left handed,  to try wrist splint and OTC pain relievers, do regular ROM exercises         FOLLOW-UP: Advised to call if there is no improvement in 1 month(s).           Vaccination against other viral diseases, Influenza           Orders:       94468  Immunization administration; one vaccine  (In-House)         15635  Influenza virus vaccine, quadrivalent, split virus, preservative free 3 years of age & older  (In-House)               Patient Recommendations:        For  Hypertension:     Begin monitoring your blood pressure by brief nurse visits at our office, a home blood pressure monitor, or by checking on the machines in pharmacies or stores.  Keep a log of the readings. Maintain a regular exercise program. Reduce the amount of salt in your diet. Try and reduce the effects of stress on blood pressure by relaxation, meditation, biofeedback, exercise or other stress reduction methods.  Schedule a follow-up visit in 6 months.              CHARGE CAPTURE           **Please note: ICD descriptions below are intended for billing purposes only and may not represent clinical diagnoses**        Primary Diagnosis:         401.1 Hypertension            I10    Essential (primary) hypertension              Orders:          38701   Office/outpatient visit; established patient, level 4  (In-House)           729.5 Arm pain            M79.601    Pain in right arm    V04.81 Vaccination against other viral diseases, Influenza            Z23    Encounter for immunization              Orders:          06122   Immunization administration; one vaccine  (In-House)             27114   Influenza virus vaccine, quadrivalent, split virus, preservative free 3 years of age & older  (In-House)               ADDENDUMS:      ____________________________________    Addendum: 10/25/2019 10:17 PM - Pia Pedraza        Add [id number]; Remove 68705

## 2021-05-18 NOTE — PROGRESS NOTES
Kiley Valentine  1970     Office/Outpatient Visit    Visit Date: Wed, Jun 24, 2020 02:40 pm    Provider: Pia Pedraza N.P. (Assistant: Josiane Rooney MA)    Location: [facility]        Electronically signed by Pia Pedraza N.P. on  06/24/2020 07:13:45 PM                             Subjective:        CC: Ms. Valnetine is a 49 year old White female.  Blood pressure high.; /Laid off in May 2020        HPI:           Patient presents with essential (primary) hypertension.  Her current cardiac medication regimen includes an ACE inhibitor ( Zestril ).  Review of her blood pressure log reveals systolics in the 129-161 and diastolics in the 74-91.  She is tolerating the medication well without side effects.  Compliance with treatment has been good; she takes her medication as directed.      ROS:     CONSTITUTIONAL:  Positive for unintentional weight gain ( >5 pounds ).   Negative for fever.      CARDIOVASCULAR:  Negative for chest pain, palpitations, tachycardia, orthopnea, and edema.      RESPIRATORY:  Positive for persistent cough ( typically dry ) and wheezing ( flared since /intermittently ).  uses inhaler prn     NEUROLOGICAL:  Negative for dizziness, headaches, paresthesias, and weakness.          Past Medical History / Family History / Social History:         Last Reviewed on 6/24/2020 03:13 PM by Pia Pedraza    Past Medical History:                 PAST MEDICAL HISTORY             GYNECOLOGICAL HISTORY:    Contraception: S/P tubal ligation;         Surgical History:         Tubal Ligation Procedures: Prior gynecologic procedures include LEEP procedure 2000.      Hysterectomy: 8-9-16; lap hysterectomy, bilateral tubes/cystoscopy /benign/fibroids;     Procedures: hysteroscopy/endometrial biopsy 4-2016         Family History:     Father: Hypertension;  Cerebrovascular Accident     Mother: Hypertension;  Type 2 Diabetes     Brother(s): 0 brother(s) total     Sister(s): 0 sister(s)  total     Paternal Grandfather: COPD     Paternal Grandmother: Hypertension         Social History:     Occupation: [employer]      Marital Status:      Children: 3 children     Exercise: Primary form of exercise is walking.   Frequency is 5 days per week.          Tobacco/Alcohol/Supplements:     Last Reviewed on 6/24/2020 02:50 PM by Josiane Rooney    Tobacco: She has a past history of cigarette smoking; quit date:  2007.          Alcohol: Frequency:    was drinking daily but quit 7-2015;     Caffeine:  She admits to consuming caffeine via coffee ( 1 serving per day ).          Substance Abuse History:     Last Reviewed on 1/09/2019 02:02 PM by Madalyn Pedraza    NEGATIVE         Mental Health History:     Last Reviewed on 1/09/2019 02:02 PM by Madalyn Pedraza        Communicable Diseases (eg STDs):     Last Reviewed on 1/09/2019 02:02 PM by Madalyn Pedraza        Immunizations:     Hep A, adult dose 11/12/2018    VAQTA -adult dose (HepA) 5/5/2018    Fluzone Quadrivalent (3+ years) 11/19/2018    Fluzone Quadrivalent (3+ years) 10/22/2019        Allergies:     Last Reviewed on 6/24/2020 02:50 PM by Josiane Rooney    No Known Allergies.        Current Medications:     Last Reviewed on 6/24/2020 02:50 PM by Josiane Rooney    ProAir HFA 90 mcg/actuation Inhalation HFA Aerosol Inhaler [Inhale 1-2 puff(s) q 4 to 6 hr]    Ventolin HFA 90 mcg/actuation Inhalation HFA Aerosol Inhaler [INHALE 1 TO 2 PUFFS BY MOUTH EVERY 4 HOURS AS NEEDED]    lisinopriL 10 mg oral tablet [TAKE 1 TABLET BY MOUTH IN THE MORNING]        Objective:        Vitals:         Current: 6/24/2020 2:50:32 PM    Ht:  5 ft, 2.5 in;  Wt: 179.8 lbs;  BMI: 32.4T: 98.2 F (temporal);  BP: 135/84 mm Hg (right arm, sitting);  P: 86 bpm (right arm (BP Cuff), sitting);  sCr: 0.72 mg/dL;  GFR: 100.38O2 Sat: 96 %        Exams:     PHYSICAL EXAM:     GENERAL: vital signs recorded - well developed, well nourished;  no apparent  distress;     NECK: carotid exam reveals no bruits;     RESPIRATORY: normal respiratory rate and pattern with no distress; diffuse expiratory wheezes; heard faintly    CARDIOVASCULAR: normal rate; rhythm is regular;  no systolic murmur; no edema;     PSYCHIATRIC:  appropriate affect and demeanor; normal speech pattern; grossly normal memory;         Assessment:         I10   Essential (primary) hypertension       493.00   Asthma   (Mild)     J45.21   Mild intermittent asthma with (acute) exacerbation           ORDERS:         Meds Prescribed:       [New Rx] Singulair 10 mg oral tablet [take 1 tablet (10 mg) by oral route once daily in the evening], #30 (thirty) tablets, Refills: 1 (one)         Radiology/Test Orders:       [id number]  Radiologic exam chest 2 views  (Send-Out)              Lab Orders:       [id number]  [facility] Comp. Metabolic Panel  (Send-Out)            86272  [facility] TSH  (Send-Out)              Procedures Ordered:       REFER  Referral to Specialist or Other Facility  (Send-Out)                      Plan:         Essential (primary) hypertensioncontinue ACE / may increase dose of her rx, reviewed her blood pressure log, gave her a new log sheet        RECOMMENDATIONS given include: perform routine monitoring of blood pressure with home blood pressure cuff and reduction of dietary salt intake.      FOLLOW-UP: pening labs LABORATORY:  Labs ordered to be performed today include Comprehensive metabolic panel and TSH.            Orders:       [id number]  [facility] Comp. Metabolic Panel  (Send-Out)            [id number]  [facility] TSH  (Send-Out)              Mild intermittent asthma with (acute) exacerbationshe has never been evaluated for asthma, continue rescue inhaler prn; trial of singulair        RADIOLOGY:  I have ordered a chest x-ray (PA and lateral) to be done today.      REFERRALS:  Referral initiated to an allergist ( [facility]; for evaluation of asthma ).            Prescriptions:        [New Rx] Singulair 10 mg oral tablet [take 1 tablet (10 mg) by oral route once daily in the evening], #30 (thirty) tablets, Refills: 1 (one)           Orders:       REFER  Referral to Specialist or Other Facility  (Send-Out)            [id number]  Radiologic exam chest 2 views  (Send-Out)                  Patient Recommendations:        For  Essential (primary) hypertension:    Begin monitoring your blood pressure by brief nurse visits at our office, a home blood pressure monitor, or by checking on the machines in pharmacies or stores.  Keep a log of the readings. Reduce the amount of salt in your diet.              Charge Capture:         Primary Diagnosis:     I10  Essential (primary) hypertension           Orders:      02515  Office/outpatient visit; established patient, level 4  (In-House)              493.00  Asthma     J45.21  Mild intermittent asthma with (acute) exacerbation

## 2021-05-18 NOTE — PROGRESS NOTES
Kiley Valentine  1970     Office/Outpatient Visit    Visit Date: Wed, Nov 27, 2019 12:55 pm    Provider: Reva Bullard N.P. (Assistant: Matilde Gray, )    Location: [facility]        Electronically signed by Reva Bullard N.P. on  11/27/2019 01:55:30 PM                             Subjective:        CC: Ms. Valentine is a 49 year old White female.  presents today due to congestion, sore throat, low grade fever         HPI:           Acute upper respiratory infection, unspecified noted.  These have been present for the past 3 days.  The symptoms include Chills, cough, subjective fever,  nasal congestion and sore throat.  She has already tried to relieve the symptoms with mixed cold/sinus preparations.      ROS:     CONSTITUTIONAL:  Positive for chills and fever ( subjective ).      EYES:  Negative for eye drainage and eye pain.      E/N/T:  Positive for nasal congestion and sore throat.   Negative for ear pain.      CARDIOVASCULAR:  Negative for chest pain and palpitations.      RESPIRATORY:  Positive for recent cough ( typically dry ).   Negative for dyspnea or frequent wheezing.      GASTROINTESTINAL:  Negative for abdominal pain and vomiting.          Past Medical History / Family History / Social History:         Last Reviewed on 10/22/2019 08:42 AM by Pia Pedraza    Past Medical History:                 PAST MEDICAL HISTORY             GYNECOLOGICAL HISTORY:    Contraception: S/P tubal ligation;         Surgical History:         Tubal Ligation Procedures: Prior gynecologic procedures include LEEP procedure 2000.      Hysterectomy: 8-9-16; lap hysterectomy, bilateral tubes/cystoscopy /benign/fibroids;     Procedures: hysteroscopy/endometrial biopsy 4-2016         Family History:     Father: Hypertension;  Cerebrovascular Accident     Mother: Hypertension;  Type 2 Diabetes     Brother(s): 0 brother(s) total     Sister(s): 0 sister(s) total     Paternal Grandfather: COPD     Paternal  Grandmother: Hypertension         Social History:     Occupation: [employer]      Marital Status:      Children: 3 children     Exercise: Primary form of exercise is walking.   Frequency is 5 days per week.          Tobacco/Alcohol/Supplements:     Last Reviewed on 10/22/2019 08:30 AM by Annabelle Emanuel    Tobacco: She has a past history of cigarette smoking; quit date:  2007.          Alcohol: Frequency:    was drinking daily but quit 7-2015;     Caffeine:  She admits to consuming caffeine via coffee ( 1 serving per day ).          Substance Abuse History:     Last Reviewed on 1/09/2019 02:02 PM by Madalyn Pedraza    NEGATIVE         Mental Health History:     Last Reviewed on 1/09/2019 02:02 PM by Madalyn Pedraza        Communicable Diseases (eg STDs):     Last Reviewed on 1/09/2019 02:02 PM by Madalyn Pedraza        Current Problems:     Last Reviewed on 1/09/2019 02:02 PM by Madalyn Pedraza    Asthma    Breast mass    Essential (primary) hypertension    Hypertension    Persistent mood [affective] disorder, unspecified    Depression with anxiety    Acute bronchitis, unspecified    Acute bronchitis    History of noncompliance with medical treatment    Mixed hyperlipidemia    Hypercholesterolemia    Pain in right arm    Arm pain        Immunizations:     Hep A, adult dose 11/12/2018    VAQTA -adult dose (HepA) 5/5/2018    Fluzone Quadrivalent (3+ years) 11/19/2018    Fluzone Quadrivalent (3+ years) 10/22/2019        Allergies:     Last Reviewed on 10/22/2019 08:30 AM by Annabelle Emanuel    No Known Allergies.        Current Medications:     Last Reviewed on 10/22/2019 08:30 AM by Annabelle Emanuel    Albuterol 90mcg/1actuation Oral Inhaler [Inhale 1-2 puff(s) q 4 to 6 hr]    Lisinopril 10mg Tablet [1 in am ]        Objective:        Vitals:         Historical:     10/22/2019  BP:   135/88 mm Hg ( (left arm, , sitting, );) 1/9/2019  BP:   120/71 mm Hg ( (left arm, ,  sitting, );)     Current: 11/27/2019 1:01:04 PM    Ht:  5 ft, 2.5 in;  Wt: 172.6 lbs;  BMI: 31.1T: 98.1 F (oral);  BP: 146/89 mm Hg (left arm, sitting);  P: 83 bpm (left arm (BP Cuff), sitting);  sCr: 0.72 mg/dL;  GFR: 98.65        Repeat:     1:15:14 PM  BP:   145/67mm Hg (right arm, sitting, HR: 70)     Exams:     PHYSICAL EXAM:     GENERAL: well developed, well nourished;  no apparent distress;     EYES: PERRL, EOMI     E/N/T: EARS: external auditory canal normal;  both TMs are dull;  NOSE: normal turbinates; no sinus tenderness; OROPHARYNX: oral mucosa is normal; posterior pharynx shows no exudate and post nasal drip;     NECK: range of motion is normal; trachea is midline;     RESPIRATORY: normal respiratory rate and pattern with no distress; normal breath sounds with no rales, rhonchi, wheezes or rubs;     CARDIOVASCULAR: normal rate; rhythm is regular;     MUSCULOSKELETAL: normal gait;     NEUROLOGIC: mental status: alert and oriented x 3; GROSSLY INTACT     PSYCHIATRIC: appropriate affect and demeanor;         Lab/Test Results:         Influenza A and B: Negative (11/27/2019),     Performed by:: jone (11/27/2019),     Rapid Strep Screen: Negative (11/27/2019),             Assessment:         J06.9   Acute upper respiratory infection, unspecified           ORDERS:         Meds Prescribed:       [New Rx] Tessalon Perles 100 mg oral capsule [take 1 capsule (100 mg) by oral route 3 times per day as needed for cough], #30 (thirty) capsules, Refills: 0 (zero)         Lab Orders:       [id number]  Infectious agent antigen detection by immunoassay; Influenza  (In-House)            59375-40  Infectious agent antigen detection by immunoassay; Influenza  (In-House)            32096  Group A Streptococcus detection by immunoassay with direct optical observation  (In-House)            [id number]  [facility] Throat culture, strep  (Send-Out)                      Plan:         Acute upper respiratory infection, unspecified     LABORATORY:  Labs ordered to be performed today include Flu A&B Flu A Flu B.      RECOMMENDATIONS given include: Push Fluids, Rest, Follow up if no improvement or worsening symptoms like high fevers, vomiting, weakness, or increasing shortness of air.    .  MIPS Vaccines Flu and Pneumonia updated in Shot record     FOLLOW-UP: Chronic visit follow up School/Work Excuse for Today Friday     RECOMMENDATIONS given include: Symptomatic treatment discussed. Antihistamine per package instructions. Warm salt water gargles. Cepacol lozenges. Chloraseptic sprays..            Prescriptions:       [New Rx] Tessalon Perles 100 mg oral capsule [take 1 capsule (100 mg) by oral route 3 times per day as needed for cough], #30 (thirty) capsules, Refills: 0 (zero)           Orders:       [id number]  Infectious agent antigen detection by immunoassay; Influenza  (In-House)            38649-99  Infectious agent antigen detection by immunoassay; Influenza  (In-House)            55778  Group A Streptococcus detection by immunoassay with direct optical observation  (In-House)            33716  [facility] Throat culture, strep  (Send-Out)                  Charge Capture:         Primary Diagnosis:     J06.9  Acute upper respiratory infection, unspecified           Orders:      [id number]  Office/outpatient visit; established patient, level 3  (In-House)            62745  Infectious agent antigen detection by immunoassay; Influenza  (In-House)            16853-56  Infectious agent antigen detection by immunoassay; Influenza  (In-House)            38045  Group A Streptococcus detection by immunoassay with direct optical observation  (In-House)

## 2021-05-18 NOTE — PROGRESS NOTES
Kiley Valentine  1970     Office/Outpatient Visit    Visit Date: Wed, Feb 12, 2020 12:51 pm    Provider: Pia Pedraza N.P. (Assistant: Annabelle Emanuel MA)    Location: [facility]        Electronically signed by Pia Pedraza N.P. on  02/12/2020 01:50:56 PM                             Subjective:        CC: Ms. Valentine is a 49 year old White female.  presents today due to changing mole on left side of neck         HPI:       skin lesion     The symptom began 6 months ago.  itches and bleeds, started getting bigger 6 months ago, it is on the left side of her neck     ROS:     CONSTITUTIONAL:  Negative for fever.      INTEGUMENTARY/BREAST:  Negative for exudate.          Past Medical History / Family History / Social History:         Last Reviewed on 2/12/2020 01:07 PM by Pia Pedraza    Past Medical History:                 PAST MEDICAL HISTORY             GYNECOLOGICAL HISTORY:    Contraception: S/P tubal ligation;         Surgical History:         Tubal Ligation Procedures: Prior gynecologic procedures include LEEP procedure 2000.      Hysterectomy: 8-9-16; lap hysterectomy, bilateral tubes/cystoscopy /benign/fibroids;     Procedures: hysteroscopy/endometrial biopsy 4-2016         Family History:     Father: Hypertension;  Cerebrovascular Accident     Mother: Hypertension;  Type 2 Diabetes     Brother(s): 0 brother(s) total     Sister(s): 0 sister(s) total     Paternal Grandfather: COPD     Paternal Grandmother: Hypertension         Social History:     Occupation: [employer]      Marital Status:      Children: 3 children     Exercise: Primary form of exercise is walking.   Frequency is 5 days per week.          Tobacco/Alcohol/Supplements:     Last Reviewed on 2/12/2020 12:54 PM by Annabelle Emanuel    Tobacco: She has a past history of cigarette smoking; quit date:  2007.          Alcohol: Frequency:    was drinking daily but quit 7-2015;      Caffeine:  She admits to consuming caffeine via coffee ( 1 serving per day ).          Substance Abuse History:     Last Reviewed on 1/09/2019 02:02 PM by Madalyn Pedraza    NEGATIVE         Mental Health History:     Last Reviewed on 1/09/2019 02:02 PM by Madalyn Pedraza        Communicable Diseases (eg STDs):     Last Reviewed on 1/09/2019 02:02 PM by Madalyn Pedraza        Immunizations:     Hep A, adult dose 11/12/2018    VAQTA -adult dose (HepA) 5/5/2018    Fluzone Quadrivalent (3+ years) 11/19/2018    Fluzone Quadrivalent (3+ years) 10/22/2019        Allergies:     Last Reviewed on 2/12/2020 12:54 PM by Annabelle Emanuel    No Known Allergies.        Current Medications:     Last Reviewed on 2/12/2020 12:54 PM by Annabelle Emanuel    ProAir HFA 90 mcg/actuation Inhalation HFA Aerosol Inhaler [Inhale 1-2 puff(s) q 4 to 6 hr]    Lisinopril 10 mg oral tablet [1 in am ]        Objective:        Vitals:         Current: 2/12/2020 12:56:47 PM    Ht:  5 ft, 2.5 in;  Wt: 176.8 lbs;  BMI: 31.8T: 98.3 F (oral);  BP: 130/63 mm Hg (left arm, sitting);  P: 79 bpm (left arm (BP Cuff), sitting);  sCr: 0.72 mg/dL;  GFR: 99.66        Exams:     PHYSICAL EXAM:     GENERAL: vital signs recorded - well developed, well nourished;  no apparent distress;     RESPIRATORY: normal respiratory rate and pattern with no distress; normal breath sounds with no rales, rhonchi, wheezes or rubs;     CARDIOVASCULAR: normal rate; rhythm is regular;  no systolic murmur;     BREAST/INTEGUMENT: atypical mole(s) mole is 7-8 mm in size, located on the left neck, and enlarging in size (by patient history);     PSYCHIATRIC:  appropriate affect and demeanor; normal speech pattern; grossly normal memory;         Assessment:         Z13.31   Encounter for screening for depression       L98.8   Other specified disorders of the skin and subcutaneous tissue           ORDERS:         Procedures Ordered:       REFER  Referral to Specialist or Other  Facility  (Send-Out)              Other Orders:         Depression screen negative  (In-House)                      Plan:         Encounter for screening for depression    MIPS PHQ-9 Depression Screening: Completed form scanned and in chart; Total Score 3; Negative Depression Screen           Orders:         Depression screen negative  (In-House)              Other specified disorders of the skin and subcutaneous tissue        REFERRALS:  Referral initiated to a dermatologist ( Dr. Pina Garnett; for evaluation of skin lesion on neck, enlarging ).            Orders:       REFER  Referral to Specialist or Other Facility  (Send-Out)                  Charge Capture:         Primary Diagnosis:     Z13.31  Encounter for screening for depression           Orders:      41510  Office/outpatient visit; established patient, level 3  (In-House)              Depression screen negative  (In-House)              L98.8  Other specified disorders of the skin and subcutaneous tissue

## 2021-07-01 VITALS
HEIGHT: 63 IN | SYSTOLIC BLOOD PRESSURE: 154 MMHG | DIASTOLIC BLOOD PRESSURE: 98 MMHG | BODY MASS INDEX: 30.3 KG/M2 | WEIGHT: 171 LBS | TEMPERATURE: 98.3 F | HEART RATE: 82 BPM

## 2021-07-01 VITALS
BODY MASS INDEX: 28.92 KG/M2 | DIASTOLIC BLOOD PRESSURE: 73 MMHG | WEIGHT: 163.2 LBS | HEIGHT: 63 IN | SYSTOLIC BLOOD PRESSURE: 124 MMHG | HEART RATE: 74 BPM | TEMPERATURE: 97.7 F

## 2021-07-01 VITALS
DIASTOLIC BLOOD PRESSURE: 71 MMHG | SYSTOLIC BLOOD PRESSURE: 120 MMHG | BODY MASS INDEX: 29.59 KG/M2 | HEART RATE: 64 BPM | TEMPERATURE: 98.3 F | HEIGHT: 63 IN | WEIGHT: 167 LBS

## 2021-07-01 VITALS
SYSTOLIC BLOOD PRESSURE: 135 MMHG | HEIGHT: 63 IN | BODY MASS INDEX: 28.86 KG/M2 | DIASTOLIC BLOOD PRESSURE: 79 MMHG | WEIGHT: 162.9 LBS | TEMPERATURE: 99 F | HEART RATE: 62 BPM

## 2021-07-01 VITALS
SYSTOLIC BLOOD PRESSURE: 145 MMHG | TEMPERATURE: 98.1 F | HEART RATE: 83 BPM | HEIGHT: 63 IN | DIASTOLIC BLOOD PRESSURE: 67 MMHG | BODY MASS INDEX: 30.58 KG/M2 | WEIGHT: 172.6 LBS

## 2021-07-01 VITALS
BODY MASS INDEX: 29.41 KG/M2 | HEART RATE: 69 BPM | SYSTOLIC BLOOD PRESSURE: 135 MMHG | WEIGHT: 166 LBS | HEIGHT: 63 IN | DIASTOLIC BLOOD PRESSURE: 88 MMHG | TEMPERATURE: 98.5 F

## 2021-07-02 VITALS
WEIGHT: 169.2 LBS | SYSTOLIC BLOOD PRESSURE: 139 MMHG | BODY MASS INDEX: 29.98 KG/M2 | HEIGHT: 63 IN | DIASTOLIC BLOOD PRESSURE: 80 MMHG | TEMPERATURE: 96.4 F | HEART RATE: 70 BPM

## 2021-07-02 VITALS
HEIGHT: 63 IN | DIASTOLIC BLOOD PRESSURE: 63 MMHG | SYSTOLIC BLOOD PRESSURE: 130 MMHG | HEART RATE: 79 BPM | WEIGHT: 176.8 LBS | TEMPERATURE: 98.3 F | BODY MASS INDEX: 31.33 KG/M2

## 2021-07-02 VITALS
HEART RATE: 86 BPM | TEMPERATURE: 98.2 F | BODY MASS INDEX: 31.86 KG/M2 | DIASTOLIC BLOOD PRESSURE: 84 MMHG | OXYGEN SATURATION: 96 % | WEIGHT: 179.8 LBS | HEIGHT: 63 IN | SYSTOLIC BLOOD PRESSURE: 135 MMHG

## 2021-07-30 RX ORDER — ALBUTEROL SULFATE 90 UG/1
AEROSOL, METERED RESPIRATORY (INHALATION)
Qty: 18 G | Refills: 0 | Status: SHIPPED | OUTPATIENT
Start: 2021-07-30 | End: 2022-03-17 | Stop reason: SDUPTHER

## 2021-09-09 ENCOUNTER — OFFICE VISIT (OUTPATIENT)
Dept: FAMILY MEDICINE CLINIC | Age: 51
End: 2021-09-09

## 2021-09-09 ENCOUNTER — LAB (OUTPATIENT)
Dept: LAB | Facility: HOSPITAL | Age: 51
End: 2021-09-09

## 2021-09-09 ENCOUNTER — TELEPHONE (OUTPATIENT)
Dept: FAMILY MEDICINE CLINIC | Age: 51
End: 2021-09-09

## 2021-09-09 VITALS
HEIGHT: 63 IN | HEART RATE: 65 BPM | BODY MASS INDEX: 29.66 KG/M2 | SYSTOLIC BLOOD PRESSURE: 123 MMHG | DIASTOLIC BLOOD PRESSURE: 73 MMHG | WEIGHT: 167.4 LBS

## 2021-09-09 DIAGNOSIS — R53.83 OTHER FATIGUE: ICD-10-CM

## 2021-09-09 DIAGNOSIS — J45.909 UNCOMPLICATED ASTHMA, UNSPECIFIED ASTHMA SEVERITY, UNSPECIFIED WHETHER PERSISTENT: ICD-10-CM

## 2021-09-09 DIAGNOSIS — Z12.11 SCREEN FOR COLON CANCER: ICD-10-CM

## 2021-09-09 DIAGNOSIS — I10 ESSENTIAL (PRIMARY) HYPERTENSION: Primary | ICD-10-CM

## 2021-09-09 DIAGNOSIS — F41.8 DEPRESSION WITH ANXIETY: ICD-10-CM

## 2021-09-09 LAB
ALBUMIN SERPL-MCNC: 4.4 G/DL (ref 3.5–5.2)
ALBUMIN/GLOB SERPL: 1.8 G/DL
ALP SERPL-CCNC: 66 U/L (ref 39–117)
ALT SERPL W P-5'-P-CCNC: 19 U/L (ref 1–33)
ANION GAP SERPL CALCULATED.3IONS-SCNC: 8.6 MMOL/L (ref 5–15)
AST SERPL-CCNC: 15 U/L (ref 1–32)
BASOPHILS # BLD AUTO: 0.06 10*3/MM3 (ref 0–0.2)
BASOPHILS NFR BLD AUTO: 0.6 % (ref 0–1.5)
BILIRUB SERPL-MCNC: 0.3 MG/DL (ref 0–1.2)
BUN SERPL-MCNC: 12 MG/DL (ref 6–20)
BUN/CREAT SERPL: 13.3 (ref 7–25)
CALCIUM SPEC-SCNC: 8.9 MG/DL (ref 8.6–10.5)
CHLORIDE SERPL-SCNC: 101 MMOL/L (ref 98–107)
CHOLEST SERPL-MCNC: 231 MG/DL (ref 0–200)
CO2 SERPL-SCNC: 26.4 MMOL/L (ref 22–29)
CREAT SERPL-MCNC: 0.9 MG/DL (ref 0.57–1)
DEPRECATED RDW RBC AUTO: 38.3 FL (ref 37–54)
EOSINOPHIL # BLD AUTO: 0.33 10*3/MM3 (ref 0–0.4)
EOSINOPHIL NFR BLD AUTO: 3.3 % (ref 0.3–6.2)
ERYTHROCYTE [DISTWIDTH] IN BLOOD BY AUTOMATED COUNT: 12.1 % (ref 12.3–15.4)
GFR SERPL CREATININE-BSD FRML MDRD: 67 ML/MIN/1.73
GLOBULIN UR ELPH-MCNC: 2.5 GM/DL
GLUCOSE SERPL-MCNC: 98 MG/DL (ref 65–99)
HCT VFR BLD AUTO: 39.7 % (ref 34–46.6)
HDLC SERPL-MCNC: 48 MG/DL (ref 40–60)
HGB BLD-MCNC: 13.5 G/DL (ref 12–15.9)
IMM GRANULOCYTES # BLD AUTO: 0.03 10*3/MM3 (ref 0–0.05)
IMM GRANULOCYTES NFR BLD AUTO: 0.3 % (ref 0–0.5)
LDLC SERPL CALC-MCNC: 152 MG/DL (ref 0–100)
LDLC/HDLC SERPL: 3.1 {RATIO}
LYMPHOCYTES # BLD AUTO: 2.69 10*3/MM3 (ref 0.7–3.1)
LYMPHOCYTES NFR BLD AUTO: 27 % (ref 19.6–45.3)
MCH RBC QN AUTO: 29.2 PG (ref 26.6–33)
MCHC RBC AUTO-ENTMCNC: 34 G/DL (ref 31.5–35.7)
MCV RBC AUTO: 85.7 FL (ref 79–97)
MONOCYTES # BLD AUTO: 0.62 10*3/MM3 (ref 0.1–0.9)
MONOCYTES NFR BLD AUTO: 6.2 % (ref 5–12)
NEUTROPHILS NFR BLD AUTO: 6.22 10*3/MM3 (ref 1.7–7)
NEUTROPHILS NFR BLD AUTO: 62.6 % (ref 42.7–76)
PLATELET # BLD AUTO: 314 10*3/MM3 (ref 140–450)
PMV BLD AUTO: 9.2 FL (ref 6–12)
POTASSIUM SERPL-SCNC: 4.1 MMOL/L (ref 3.5–5.2)
PROT SERPL-MCNC: 6.9 G/DL (ref 6–8.5)
RBC # BLD AUTO: 4.63 10*6/MM3 (ref 3.77–5.28)
SODIUM SERPL-SCNC: 136 MMOL/L (ref 136–145)
TRIGL SERPL-MCNC: 172 MG/DL (ref 0–150)
TSH SERPL DL<=0.05 MIU/L-ACNC: 1.2 UIU/ML (ref 0.27–4.2)
VLDLC SERPL-MCNC: 31 MG/DL (ref 5–40)
WBC # BLD AUTO: 9.95 10*3/MM3 (ref 3.4–10.8)

## 2021-09-09 PROCEDURE — 85025 COMPLETE CBC W/AUTO DIFF WBC: CPT

## 2021-09-09 PROCEDURE — 80053 COMPREHEN METABOLIC PANEL: CPT | Performed by: NURSE PRACTITIONER

## 2021-09-09 PROCEDURE — 80061 LIPID PANEL: CPT | Performed by: NURSE PRACTITIONER

## 2021-09-09 PROCEDURE — 36415 COLL VENOUS BLD VENIPUNCTURE: CPT | Performed by: NURSE PRACTITIONER

## 2021-09-09 PROCEDURE — 99214 OFFICE O/P EST MOD 30 MIN: CPT | Performed by: NURSE PRACTITIONER

## 2021-09-09 PROCEDURE — 84443 ASSAY THYROID STIM HORMONE: CPT

## 2021-09-09 RX ORDER — LISINOPRIL 10 MG/1
10 TABLET ORAL EVERY MORNING
Qty: 90 TABLET | Refills: 1 | Status: SHIPPED | OUTPATIENT
Start: 2021-09-09 | End: 2022-03-17 | Stop reason: SDUPTHER

## 2021-09-09 RX ORDER — BUPROPION HYDROCHLORIDE 150 MG/1
150 TABLET ORAL DAILY
Qty: 30 TABLET | Refills: 2 | Status: SHIPPED | OUTPATIENT
Start: 2021-09-09 | End: 2021-09-09 | Stop reason: SDUPTHER

## 2021-09-09 RX ORDER — LISINOPRIL 10 MG/1
10 TABLET ORAL EVERY MORNING
COMMUNITY
Start: 2021-06-06 | End: 2021-09-09 | Stop reason: SDUPTHER

## 2021-09-09 NOTE — TELEPHONE ENCOUNTER
Caller: Kiley Valentine    Relationship: Self    Best call back number: [phone]    Medication needed:   Requested Prescriptions     Pending Prescriptions Disp Refills   • buPROPion XL (Wellbutrin XL) 150 MG 24 hr tablet 30 tablet 2     Sig: Take 1 tablet by mouth Daily.       When do you need the refill by: ASAP    Does the patient have less than a 3 day supply:  [x] Yes  [] No    What is the patient's preferred pharmacy: [facility] [address] [place] [address] - [facility] JOCELINE FERNÁNDEZ  - [phone] [facility] [phone] FX

## 2021-09-09 NOTE — ASSESSMENT & PLAN NOTE
Hypertension is stable. monitor BP at home. Continue current meds. Continue to modify diet and lifestyle. Will need labs every 6 months and follow up.   Congratulated her on alcohol cessation.

## 2021-09-09 NOTE — PROGRESS NOTES
"Kiley Valentine presents to [facility].    Chief Complaint:  Follow-up (6 month follow up)         History of Present Illness:  Hypertension:  Current medication Lisinopril  Tolerating Medication: Yes  Checking BP at home and it is : not checking   Needs refills: Yes  Labs   Lab Results       Component                Value               Date                       BUN                      12                  06/24/2020                 CREATININE               0.72                06/24/2020                 BCR                      17                  06/24/2020                 K                        4.2                 06/24/2020                 CO2                      25                  06/24/2020                 CALCIUM                  9.1                 06/24/2020                 ALBUMIN                  4.2                 06/24/2020                 LABIL2                   1.8                 06/24/2020                 AST                      13 (L)              06/24/2020                 ALT                      14                  06/24/2020              Anxiety/ Depression  Current medication/none did take wellbutrin in past and would like to get back on   Stressors: quit drinking 7 weeks ago/going through the change /hormonal           PMH changes: history of asthma  Works at [employer]  Had BTL          Review of Systems:  Review of Systems   Constitutional: Positive for fatigue. Negative for fever.   Respiratory: Negative for cough and shortness of breath.         Hx asthma     Cardiovascular: Negative for chest pain, palpitations and leg swelling.   Gastrointestinal: Negative for blood in stool.   Neurological: Negative for numbness.   Psychiatric/Behavioral: Negative for suicidal ideas.          Vital Signs:   /73 (BP Location: Right arm, Patient Position: Sitting, Cuff Size: Adult)   Pulse 65   Ht 158.8 cm (62.5\")   Wt 75.9 kg (167 lb 6.4 oz)   BMI 30.13 kg/m²   "     Physical Exam:  Physical Exam  Vitals reviewed.   Constitutional:       General: She is not in acute distress.     Appearance: Normal appearance.   Neck:      Vascular: No carotid bruit.   Cardiovascular:      Rate and Rhythm: Normal rate and regular rhythm.      Heart sounds: Normal heart sounds. No murmur heard.     Pulmonary:      Effort: Pulmonary effort is normal. No respiratory distress.      Breath sounds: Normal breath sounds.   Musculoskeletal:      Right lower leg: No edema.      Left lower leg: No edema.   Neurological:      Mental Status: She is alert.   Psychiatric:         Mood and Affect: Mood normal.         Behavior: Behavior normal.         Result Review      The following data was reviewed by: ABNER La on 09/09/2021:    Results for orders placed or performed in visit on 08/23/21    PAP SMEAR   Result Value Ref Range     Pap smear SEE REPORT                Assessment and Plan:          Diagnoses and all orders for this visit:    1. Essential (primary) hypertension (Primary)  Assessment & Plan:  Hypertension is stable. monitor BP at home. Continue current meds. Continue to modify diet and lifestyle. Will need labs every 6 months and follow up.   Congratulated her on alcohol cessation.       Orders:  -     Comprehensive Metabolic Panel  -     Lipid Panel  -     lisinopril (PRINIVIL,ZESTRIL) 10 MG tablet; Take 1 tablet by mouth Every Morning.  Dispense: 90 tablet; Refill: 1    2. Depression with anxiety  Assessment & Plan:    Recommend healthy diet and regular exercise. Sleep discussed, consider sleep apnea evaluation, let me know if not improving in the next month       Orders:  -     buPROPion XL (Wellbutrin XL) 150 MG 24 hr tablet; Take 1 tablet by mouth Daily.  Dispense: 30 tablet; Refill: 2    3. Other fatigue  Assessment & Plan:  Checking labs     Orders:  -     CBC & Differential; Future  -     TSH; Future    4. Screen for colon cancer  Assessment & Plan:  Will be 51  later this year, has not had a colonoscopy, set that up     Orders:  -     Ambulatory Referral to Gastroenterology    5. Uncomplicated asthma, unspecified asthma severity, unspecified whether persistent  Assessment & Plan:  Has asthma, has a rescue inhaler she uses prn             Follow Up   Return if symptoms worsen or fail to improve, for followup pending lab results.  Patient was given instructions and counseling regarding her condition or for health maintenance advice. Please see specific information pulled into the AVS if appropriate.

## 2021-09-09 NOTE — TELEPHONE ENCOUNTER
Caller: Kiley Valentine    Relationship to patient: Self    Best call back number: [phone]    Patient is needing: PATIENT WOULD LIKE A CALL WHEN THE PRESCRIPTION IS SENT TO [facility].    Caller: Kiley Valentine     Relationship: Self     Best call back number: [phone]     Medication needed:   Requested Prescriptions             Pending Prescriptions Disp Refills   • buPROPion XL (Wellbutrin XL) 150 MG 24 hr tablet 30 tablet 2       Sig: Take 1 tablet by mouth Daily.         When do you need the refill by: ASAP     Does the patient have less than a 3 day supply:  [x]? Yes  []? No     What is the patient's preferred pharmacy: [facility] [address] [place] [address] - [address]  JOCELINE FERNÁNDEZ  - [phone] [facility] [phone] FX

## 2021-09-09 NOTE — ASSESSMENT & PLAN NOTE
Recommend healthy diet and regular exercise. Sleep discussed, consider sleep apnea evaluation, let me know if not improving in the next month

## 2021-09-10 ENCOUNTER — TELEPHONE (OUTPATIENT)
Dept: FAMILY MEDICINE CLINIC | Age: 51
End: 2021-09-10

## 2021-09-10 RX ORDER — BUPROPION HYDROCHLORIDE 150 MG/1
150 TABLET ORAL DAILY
Qty: 90 TABLET | Refills: 1 | Status: SHIPPED | OUTPATIENT
Start: 2021-09-10 | End: 2022-03-17

## 2021-09-10 NOTE — TELEPHONE ENCOUNTER
HUB TO READ    PT CAME INTO OFFICE BC HER WELLBRUTIN WAS SENT TO [facility] BUT NEEDS TO BE SENT TO [facility]. SHE CALLED YESTERDAY AND SAYS SHE WAS TOLD THAT IT WAS DONE BUT [facility] DOES NOT HAVE THE SCRIPT. ADVISED PT THAT I WOULD SEND FORWARD THE [facility] & SOMEONE WOULD CALL HER WHEN COMPLETED.

## 2021-09-10 NOTE — TELEPHONE ENCOUNTER
Rx Refill Note  Requested Prescriptions     Pending Prescriptions Disp Refills   • buPROPion XL (Wellbutrin XL) 150 MG 24 hr tablet 30 tablet 2     Sig: Take 1 tablet by mouth Daily.      Last office visit with prescribing clinician: 9/9/2021      Next office visit with prescribing clinician: 03/03/22  Pt needing this sent to [facility] is temporary closed due to staffing issues.     Zoë Li LPN  09/10/21, 09:46 EDT

## 2022-03-17 ENCOUNTER — OFFICE VISIT (OUTPATIENT)
Dept: FAMILY MEDICINE CLINIC | Age: 52
End: 2022-03-17

## 2022-03-17 VITALS
BODY MASS INDEX: 30.16 KG/M2 | WEIGHT: 170.2 LBS | SYSTOLIC BLOOD PRESSURE: 115 MMHG | HEART RATE: 78 BPM | HEIGHT: 63 IN | DIASTOLIC BLOOD PRESSURE: 79 MMHG

## 2022-03-17 DIAGNOSIS — J45.20 MILD INTERMITTENT ASTHMA WITHOUT COMPLICATION: Primary | ICD-10-CM

## 2022-03-17 DIAGNOSIS — I10 ESSENTIAL (PRIMARY) HYPERTENSION: ICD-10-CM

## 2022-03-17 PROCEDURE — 99213 OFFICE O/P EST LOW 20 MIN: CPT | Performed by: NURSE PRACTITIONER

## 2022-03-17 RX ORDER — ALBUTEROL SULFATE 90 UG/1
1-2 AEROSOL, METERED RESPIRATORY (INHALATION) EVERY 4 HOURS PRN
Qty: 18 G | Refills: 1 | Status: SHIPPED | OUTPATIENT
Start: 2022-03-17 | End: 2022-10-11

## 2022-03-17 RX ORDER — LISINOPRIL 10 MG/1
10 TABLET ORAL EVERY MORNING
Qty: 90 TABLET | Refills: 1 | Status: SHIPPED | OUTPATIENT
Start: 2022-03-17 | End: 2022-10-11 | Stop reason: SDUPTHER

## 2022-03-17 NOTE — ASSESSMENT & PLAN NOTE
Hypertension is stable. \to monitor BP at home. Continue current meds. Continue to modify diet and lifestyle. Will need labs every 6 months and follow up.   She has started eating healthier and exercising recently, will come in a month and get fasting labs

## 2022-03-17 NOTE — PROGRESS NOTES
Kiley Valentine presents to [facility].    Chief Complaint:  Hypertension (Follow up/)         History of Present Illness:  Hypertension:  Current medication : lisinopril   Tolerating Medication: Yes  Checking BP at home and it is : not checking   Needs refills: Yes, jose   Labs   Lab Results       Component                Value               Date                       GLUCOSE                  98                  09/09/2021                 BUN                      12                  09/09/2021                 CREATININE               0.90                09/09/2021                 EGFRIFNONA               67                  09/09/2021                 BCR                      13.3                09/09/2021                 K                        4.1                 09/09/2021                 CO2                      26.4                09/09/2021                 CALCIUM                  8.9                 09/09/2021                 ALBUMIN                  4.40                09/09/2021                 LABIL2                   1.8                 06/24/2020                 AST                      15                  09/09/2021                 ALT                      19                  09/09/2021              Anxiety/ Depression  Current medication/was on Wellbutrin, none, stopped rx not needed   Stressors: none   Current symptoms: none        PAST MEDICAL HISTORY   Changes since 9-2021:             GYNECOLOGICAL HISTORY:    Contraception: S/P tubal ligation; Hospitalizations:    Asthma admitted once on 2002         Surgical History:         Tubal Ligation Procedures: Prior gynecologic procedures include LEEP procedure 2000.      Hysterectomy: 8-9-16; lap hysterectomy, bilateral tubes/cystoscopy /benign/fibroids;     Procedures: hysteroscopy/endometrial biopsy 4-2016         Family History:     Father: Hypertension;  Cerebrovascular Accident     Mother: Hypertension;  Type 2  "Diabetes     Brother(s): 0 brother(s) total     Sister(s): 0 sister(s) total     Paternal Grandfather: COPD     Paternal Grandmother: Hypertension         Social History:     Occupation: [employer]      Marital Status:       Children: 3 children   .          Review of Systems:  Review of Systems   Constitutional: Negative for fatigue and fever.   Respiratory: Negative for cough and shortness of breath.         Has asthma, needs a refill on her rescue inhaler to have on hand    Cardiovascular: Negative for chest pain, palpitations and leg swelling.   Neurological: Negative for numbness.          Current Outpatient Medications:   •  albuterol sulfate  (90 Base) MCG/ACT inhaler, Inhale 1-2 puffs Every 4 (Four) Hours As Needed for Wheezing., Disp: 18 g, Rfl: 1  •  lisinopril (PRINIVIL,ZESTRIL) 10 MG tablet, Take 1 tablet by mouth Every Morning., Disp: 90 tablet, Rfl: 1    Vital Signs:   Vitals:    03/17/22 1448   BP: 115/79   BP Location: Right arm   Patient Position: Sitting   Pulse: 78   Weight: 77.2 kg (170 lb 3.2 oz)   Height: 158.8 cm (62.5\")         Physical Exam:  Physical Exam  Vitals reviewed.   Constitutional:       General: She is not in acute distress.     Appearance: Normal appearance.   Neck:      Vascular: No carotid bruit.   Cardiovascular:      Rate and Rhythm: Normal rate and regular rhythm.      Heart sounds: Normal heart sounds. No murmur heard.  Pulmonary:      Effort: Pulmonary effort is normal. No respiratory distress.      Breath sounds: Normal breath sounds.   Musculoskeletal:      Right lower leg: No edema.      Left lower leg: No edema.   Neurological:      Mental Status: She is alert.   Psychiatric:         Mood and Affect: Mood normal.         Behavior: Behavior normal.         Result Review      The following data was reviewed by: ABNER La on 03/17/2022:    Results for orders placed or performed in visit on 09/09/21   TSH    Specimen: Blood "   Result Value Ref Range    TSH 1.200 0.270 - 4.200 uIU/mL   CBC Auto Differential    Specimen: Blood   Result Value Ref Range    WBC 9.95 3.40 - 10.80 10*3/mm3    RBC 4.63 3.77 - 5.28 10*6/mm3    Hemoglobin 13.5 12.0 - 15.9 g/dL    Hematocrit 39.7 34.0 - 46.6 %    MCV 85.7 79.0 - 97.0 fL    MCH 29.2 26.6 - 33.0 pg    MCHC 34.0 31.5 - 35.7 g/dL    RDW 12.1 (L) 12.3 - 15.4 %    RDW-SD 38.3 37.0 - 54.0 fl    MPV 9.2 6.0 - 12.0 fL    Platelets 314 140 - 450 10*3/mm3    Neutrophil % 62.6 42.7 - 76.0 %    Lymphocyte % 27.0 19.6 - 45.3 %    Monocyte % 6.2 5.0 - 12.0 %    Eosinophil % 3.3 0.3 - 6.2 %    Basophil % 0.6 0.0 - 1.5 %    Immature Grans % 0.3 0.0 - 0.5 %    Neutrophils, Absolute 6.22 1.70 - 7.00 10*3/mm3    Lymphocytes, Absolute 2.69 0.70 - 3.10 10*3/mm3    Monocytes, Absolute 0.62 0.10 - 0.90 10*3/mm3    Eosinophils, Absolute 0.33 0.00 - 0.40 10*3/mm3    Basophils, Absolute 0.06 0.00 - 0.20 10*3/mm3    Immature Grans, Absolute 0.03 0.00 - 0.05 10*3/mm3               Assessment and Plan:          Diagnoses and all orders for this visit:    1. Mild intermittent asthma without complication (Primary)  -     albuterol sulfate  (90 Base) MCG/ACT inhaler; Inhale 1-2 puffs Every 4 (Four) Hours As Needed for Wheezing.  Dispense: 18 g; Refill: 1    2. Essential (primary) hypertension  Assessment & Plan:  Hypertension is stable. \to monitor BP at home. Continue current meds. Continue to modify diet and lifestyle. Will need labs every 6 months and follow up.   She has started eating healthier and exercising recently, will come in a month and get fasting labs       Orders:  -     lisinopril (PRINIVIL,ZESTRIL) 10 MG tablet; Take 1 tablet by mouth Every Morning.  Dispense: 90 tablet; Refill: 1  -     Comprehensive Metabolic Panel; Future  -     Lipid Panel; Future        Follow Up   Return for fasting for labs.  Patient was given instructions and counseling regarding her condition or for health maintenance advice.  Please see specific information pulled into the AVS if appropriate.

## 2022-04-22 ENCOUNTER — TELEPHONE (OUTPATIENT)
Dept: FAMILY MEDICINE CLINIC | Age: 52
End: 2022-04-22

## 2022-04-22 NOTE — TELEPHONE ENCOUNTER
Called pt who will be in on Monday 4/25/22 to get labs done prior to going to work. Will postpone.

## 2022-10-10 DIAGNOSIS — I10 ESSENTIAL (PRIMARY) HYPERTENSION: ICD-10-CM

## 2022-10-10 RX ORDER — LISINOPRIL 10 MG/1
10 TABLET ORAL EVERY MORNING
Qty: 90 TABLET | Refills: 1 | Status: CANCELLED | OUTPATIENT
Start: 2022-10-10

## 2022-10-10 NOTE — TELEPHONE ENCOUNTER
Rx Refill Note  Requested Prescriptions     Pending Prescriptions Disp Refills   • lisinopril (PRINIVIL,ZESTRIL) 10 MG tablet 90 tablet 1     Sig: Take 1 tablet by mouth Every Morning.      Last office visit with prescribing clinician: 3/17/2022      Next office visit with prescribing clinician: 10/11/2022  Comprehensive Metabolic Panel (09/09/2021 11:58)   Pending labs not completed.  She has a f/u appt 10/11/22.       Zoë Li LPN  10/10/22, 12:28 EDT

## 2022-10-10 NOTE — TELEPHONE ENCOUNTER
She has an appt tomorrow, I can fill her rx then, unless she is out, but looks like she needs labs

## 2022-10-10 NOTE — TELEPHONE ENCOUNTER
Caller: Kiley Valentine    Relationship: Self    Best call back number:    [phone]          Requested Prescriptions:   Requested Prescriptions     Pending Prescriptions Disp Refills   • lisinopril (PRINIVIL,ZESTRIL) 10 MG tablet 90 tablet 1     Sig: Take 1 tablet by mouth Every Morning.        Pharmacy where request should be sent: [id number] - [place], [place] - [address] AT [facility] [address] [facility] - [phone] [facility] [phone] FX     Does the patient have less than a 3 day supply:  [x] Yes  [] No    Chilango Faust Rep   10/10/22 09:16 EDT

## 2022-10-11 ENCOUNTER — OFFICE VISIT (OUTPATIENT)
Dept: FAMILY MEDICINE CLINIC | Age: 52
End: 2022-10-11

## 2022-10-11 ENCOUNTER — LAB (OUTPATIENT)
Dept: LAB | Facility: HOSPITAL | Age: 52
End: 2022-10-11

## 2022-10-11 VITALS
HEIGHT: 63 IN | RESPIRATION RATE: 18 BRPM | SYSTOLIC BLOOD PRESSURE: 126 MMHG | DIASTOLIC BLOOD PRESSURE: 84 MMHG | WEIGHT: 161.6 LBS | BODY MASS INDEX: 28.63 KG/M2 | HEART RATE: 72 BPM | OXYGEN SATURATION: 98 %

## 2022-10-11 DIAGNOSIS — I10 ESSENTIAL (PRIMARY) HYPERTENSION: ICD-10-CM

## 2022-10-11 DIAGNOSIS — I10 ESSENTIAL (PRIMARY) HYPERTENSION: Primary | ICD-10-CM

## 2022-10-11 DIAGNOSIS — J45.20 MILD INTERMITTENT ASTHMA WITHOUT COMPLICATION: ICD-10-CM

## 2022-10-11 PROCEDURE — 36415 COLL VENOUS BLD VENIPUNCTURE: CPT

## 2022-10-11 PROCEDURE — 80053 COMPREHEN METABOLIC PANEL: CPT

## 2022-10-11 PROCEDURE — 99213 OFFICE O/P EST LOW 20 MIN: CPT | Performed by: NURSE PRACTITIONER

## 2022-10-11 PROCEDURE — 80061 LIPID PANEL: CPT

## 2022-10-11 RX ORDER — ALBUTEROL SULFATE 90 MCG
2 HFA AEROSOL WITH ADAPTER (GRAM) INHALATION EVERY 4 HOURS PRN
Qty: 8 G | Refills: 1 | Status: SHIPPED | OUTPATIENT
Start: 2022-10-11

## 2022-10-11 RX ORDER — LISINOPRIL 10 MG/1
10 TABLET ORAL EVERY MORNING
Qty: 90 TABLET | Refills: 1 | Status: SHIPPED | OUTPATIENT
Start: 2022-10-11

## 2022-10-11 NOTE — PROGRESS NOTES
Kiley Valentine presents to [facility].    Chief Complaint:  Hypertension, Asthma, and Follow-up (6 month)         History of Present Illness:  Hypertension:  Current medication: lisinopril   Tolerating Medication: Yes   Checking BP at home and it is: 120-130's over 180's   Needs refills: Yes to [facility]   Labs:  Lab Results       Component                Value               Date                       GLUCOSE                  98                  09/09/2021                 BUN                      12                  09/09/2021                 CREATININE               0.90                09/09/2021                 EGFRIFNONA               67                  09/09/2021                 BCR                      13.3                09/09/2021                 K                        4.1                 09/09/2021                 CO2                      26.4                09/09/2021                 CALCIUM                  8.9                 09/09/2021                 ALBUMIN                  4.40                09/09/2021                 LABIL2                   1.8                 06/24/2020                 AST                      15                  09/09/2021                 ALT                      19                  09/09/2021              PAST MEDICAL HISTORY   Changes since 3-2022:             GYNECOLOGICAL HISTORY:    Contraception: S/P tubal ligation; Hospitalizations:    Asthma admitted once on 2002         Surgical History:         Tubal Ligation Procedures: Prior gynecologic procedures include LEEP procedure 2000.      Hysterectomy: 8-9-16; lap hysterectomy, bilateral tubes/cystoscopy /benign/fibroids;     Procedures: hysteroscopy/endometrial biopsy 4-2016         Family History:     Father: Hypertension;  Cerebrovascular Accident     Mother: Hypertension;  Type 2 Diabetes     Brother(s): 0 brother(s) total     Sister(s): 0 sister(s) total     Paternal Grandfather: COPD      "Paternal Grandmother: Hypertension         Social History:     Occupation: [employer]      Marital Status:       Children: 3 children       Review of Systems:  Review of Systems   Constitutional: Negative for fatigue and fever.   Respiratory: Positive for wheezing (history of asthma, wheezes at times, needs a refill of proventil ). Negative for cough and shortness of breath.    Cardiovascular: Negative for chest pain, palpitations and leg swelling.   Neurological: Negative for numbness.          Current Outpatient Medications:   •  lisinopril (PRINIVIL,ZESTRIL) 10 MG tablet, Take 1 tablet by mouth Every Morning., Disp: 90 tablet, Rfl: 1  •  Proventil  (90 Base) MCG/ACT inhaler, Inhale 2 puffs Every 4 (Four) Hours As Needed for Wheezing., Disp: 8 g, Rfl: 1    Vital Signs:   Vitals:    10/11/22 1618   BP: 126/84   BP Location: Right arm   Patient Position: Sitting   Cuff Size: Adult   Pulse: 72   Resp: 18   SpO2: 98%  Comment: room air   Weight: 73.3 kg (161 lb 9.6 oz)   Height: 158.8 cm (62.5\")   PainSc: 0-No pain         Physical Exam:  Physical Exam  Vitals reviewed.   Constitutional:       General: She is not in acute distress.     Appearance: Normal appearance.   Neck:      Vascular: No carotid bruit.   Cardiovascular:      Rate and Rhythm: Normal rate and regular rhythm.      Heart sounds: Normal heart sounds. No murmur heard.  Pulmonary:      Effort: Pulmonary effort is normal. No respiratory distress.      Breath sounds: Normal breath sounds.   Musculoskeletal:      Right lower leg: No edema.      Left lower leg: No edema.   Neurological:      Mental Status: She is alert.   Psychiatric:         Mood and Affect: Mood normal.         Behavior: Behavior normal.         Result Review      The following data was reviewed by: ABNER La on 10/11/2022:    Results for orders placed or performed in visit on 09/09/21   TSH    Specimen: Blood   Result Value Ref Range    TSH " 1.200 0.270 - 4.200 uIU/mL   CBC Auto Differential    Specimen: Blood   Result Value Ref Range    WBC 9.95 3.40 - 10.80 10*3/mm3    RBC 4.63 3.77 - 5.28 10*6/mm3    Hemoglobin 13.5 12.0 - 15.9 g/dL    Hematocrit 39.7 34.0 - 46.6 %    MCV 85.7 79.0 - 97.0 fL    MCH 29.2 26.6 - 33.0 pg    MCHC 34.0 31.5 - 35.7 g/dL    RDW 12.1 (L) 12.3 - 15.4 %    RDW-SD 38.3 37.0 - 54.0 fl    MPV 9.2 6.0 - 12.0 fL    Platelets 314 140 - 450 10*3/mm3    Neutrophil % 62.6 42.7 - 76.0 %    Lymphocyte % 27.0 19.6 - 45.3 %    Monocyte % 6.2 5.0 - 12.0 %    Eosinophil % 3.3 0.3 - 6.2 %    Basophil % 0.6 0.0 - 1.5 %    Immature Grans % 0.3 0.0 - 0.5 %    Neutrophils, Absolute 6.22 1.70 - 7.00 10*3/mm3    Lymphocytes, Absolute 2.69 0.70 - 3.10 10*3/mm3    Monocytes, Absolute 0.62 0.10 - 0.90 10*3/mm3    Eosinophils, Absolute 0.33 0.00 - 0.40 10*3/mm3    Basophils, Absolute 0.06 0.00 - 0.20 10*3/mm3    Immature Grans, Absolute 0.03 0.00 - 0.05 10*3/mm3               Assessment and Plan:          Diagnoses and all orders for this visit:    1. Essential (primary) hypertension (Primary)  Assessment & Plan:  Hypertension is stable. Continue to monitor BP at home. Continue current meds. She is not fasting but will go ahead and get her labs today, reviewed last labs.   She had flu vaccine through work, declines covid booster today     Orders:  -     lisinopril (PRINIVIL,ZESTRIL) 10 MG tablet; Take 1 tablet by mouth Every Morning.  Dispense: 90 tablet; Refill: 1  -     Comprehensive metabolic panel  -     Lipid panel    2. Mild intermittent asthma without complication  Assessment & Plan:  She prefers proventil brand inhaler, sent to her pharmacy       Orders:  -     Proventil  (90 Base) MCG/ACT inhaler; Inhale 2 puffs Every 4 (Four) Hours As Needed for Wheezing.  Dispense: 8 g; Refill: 1        Follow Up   Return for followup pending lab results.  Patient was given instructions and counseling regarding her condition or for health maintenance  advice. Please see specific information pulled into the AVS if appropriate.

## 2022-10-11 NOTE — ASSESSMENT & PLAN NOTE
Hypertension is stable. Continue to monitor BP at home. Continue current meds. She is not fasting but will go ahead and get her labs today, reviewed last labs.   She had flu vaccine through work, declines covid booster today

## 2022-10-12 LAB
ALBUMIN SERPL-MCNC: 4.5 G/DL (ref 3.5–5.2)
ALBUMIN/GLOB SERPL: 1.8 G/DL
ALP SERPL-CCNC: 85 U/L (ref 39–117)
ALT SERPL W P-5'-P-CCNC: 17 U/L (ref 1–33)
ANION GAP SERPL CALCULATED.3IONS-SCNC: 7.6 MMOL/L (ref 5–15)
AST SERPL-CCNC: 15 U/L (ref 1–32)
BILIRUB SERPL-MCNC: 0.2 MG/DL (ref 0–1.2)
BUN SERPL-MCNC: 13 MG/DL (ref 6–20)
BUN/CREAT SERPL: 19.7 (ref 7–25)
CALCIUM SPEC-SCNC: 9.3 MG/DL (ref 8.6–10.5)
CHLORIDE SERPL-SCNC: 102 MMOL/L (ref 98–107)
CHOLEST SERPL-MCNC: 241 MG/DL (ref 0–200)
CO2 SERPL-SCNC: 29.4 MMOL/L (ref 22–29)
CREAT SERPL-MCNC: 0.66 MG/DL (ref 0.57–1)
EGFRCR SERPLBLD CKD-EPI 2021: 107 ML/MIN/1.73
GLOBULIN UR ELPH-MCNC: 2.5 GM/DL
GLUCOSE SERPL-MCNC: 92 MG/DL (ref 65–99)
HDLC SERPL-MCNC: 71 MG/DL (ref 40–60)
LDLC SERPL CALC-MCNC: 140 MG/DL (ref 0–100)
LDLC/HDLC SERPL: 1.91 {RATIO}
POTASSIUM SERPL-SCNC: 4 MMOL/L (ref 3.5–5.2)
PROT SERPL-MCNC: 7 G/DL (ref 6–8.5)
SODIUM SERPL-SCNC: 139 MMOL/L (ref 136–145)
TRIGL SERPL-MCNC: 171 MG/DL (ref 0–150)
VLDLC SERPL-MCNC: 30 MG/DL (ref 5–40)

## 2023-04-08 DIAGNOSIS — I10 ESSENTIAL (PRIMARY) HYPERTENSION: ICD-10-CM

## 2023-04-10 RX ORDER — LISINOPRIL 10 MG/1
10 TABLET ORAL EVERY MORNING
Qty: 90 TABLET | Refills: 0 | Status: SHIPPED | OUTPATIENT
Start: 2023-04-10 | End: 2023-04-11 | Stop reason: SDUPTHER

## 2023-04-10 NOTE — TELEPHONE ENCOUNTER
Rx Refill Note  Requested Prescriptions     Pending Prescriptions Disp Refills   • lisinopril (PRINIVIL,ZESTRIL) 10 MG tablet [Pharmacy Med Name: LISINOPRIL 10MG TABLETS] 90 tablet 1     Sig: TAKE 1 TABLET BY MOUTH EVERY MORNING      Last office visit with prescribing clinician: 10/11/2022       Next office visit with prescribing clinician: 4/11/2023       {Paula Carrasco LPN  04/10/23, 09:00 EDT

## 2023-04-11 ENCOUNTER — OFFICE VISIT (OUTPATIENT)
Dept: FAMILY MEDICINE CLINIC | Age: 53
End: 2023-04-11
Payer: COMMERCIAL

## 2023-04-11 VITALS
HEIGHT: 63 IN | HEART RATE: 75 BPM | SYSTOLIC BLOOD PRESSURE: 152 MMHG | DIASTOLIC BLOOD PRESSURE: 82 MMHG | RESPIRATION RATE: 16 BRPM | WEIGHT: 165.2 LBS | BODY MASS INDEX: 29.27 KG/M2

## 2023-04-11 DIAGNOSIS — I10 ESSENTIAL (PRIMARY) HYPERTENSION: ICD-10-CM

## 2023-04-11 DIAGNOSIS — Z12.11 SCREEN FOR COLON CANCER: ICD-10-CM

## 2023-04-11 DIAGNOSIS — Z11.59 SCREENING FOR VIRAL DISEASE: ICD-10-CM

## 2023-04-11 DIAGNOSIS — Z00.00 ROUTINE GENERAL MEDICAL EXAMINATION AT A HEALTH CARE FACILITY: Primary | ICD-10-CM

## 2023-04-11 DIAGNOSIS — Z12.31 SCREENING MAMMOGRAM FOR BREAST CANCER: ICD-10-CM

## 2023-04-11 PROCEDURE — 99396 PREV VISIT EST AGE 40-64: CPT | Performed by: NURSE PRACTITIONER

## 2023-04-11 RX ORDER — LISINOPRIL 10 MG/1
10 TABLET ORAL EVERY MORNING
Qty: 90 TABLET | Refills: 0 | Status: SHIPPED | OUTPATIENT
Start: 2023-04-11

## 2023-04-11 NOTE — PROGRESS NOTES
Kiley Valentine presents to [facility].    Chief Complaint:  Annual Exam         History of Present Illness:  General Health Questions  Regular exercise as least 3 days a week: yes   Follows a healthy diet: yes   Wears seat belt always: yes   Drinks Alcohol: socially   Uses Recreational drugs: none   Uses tobacco products: none   UTD on Tetanus vaccine probably 10 years or longer ago   Does BSE: yes   Last mammogram: over a year   Last colon screen:none     Hypertension:  Current medication: lisinopril   Tolerating Medication: Yes  Checking BP at home and it is: not checking   Needs refills: Yes. [facility]  Labs:  Lab Results       Component                Value               Date                       GLUCOSE                  92                  10/11/2022                 BUN                      13                  10/11/2022                 CREATININE               0.66                10/11/2022                 EGFRIFNONA               67                  09/09/2021                 BCR                      19.7                10/11/2022                 K                        4.0                 10/11/2022                 CO2                      29.4 (H)            10/11/2022                 CALCIUM                  9.3                 10/11/2022                 ALBUMIN                  4.50                10/11/2022                 LABIL2                   1.8                 06/24/2020                 AST                      15                  10/11/2022                 ALT                      17                  10/11/2022                PAST MEDICAL HISTORY   Changes since :         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       "Hysterectomy: 8-9-16; lap hysterectomy, bilateral tubes/cystoscopy /benign/fibroids;     Procedures: hysteroscopy/endometrial biopsy 4-2016         Family History:     Father: Hypertension;  Cerebrovascular Accident     Mother: Hypertension;  Type 2 Diabetes     Brother(s): 0 brother(s) total     Sister(s): 0 sister(s) total     Paternal Grandfather: COPD     Paternal Grandmother: Hypertension         Social History:     Occupation: [employer]      Marital Status:       Children: 3 children          Review of Systems:  Review of Systems   Constitutional: Negative for fatigue and fever.   HENT: Negative for ear pain and sore throat.    Eyes: Negative for blurred vision.   Respiratory: Negative for cough and shortness of breath.    Cardiovascular: Negative for chest pain, palpitations and leg swelling.   Gastrointestinal: Negative for abdominal pain, constipation, diarrhea, nausea and vomiting.   Musculoskeletal: Negative for arthralgias and myalgias.   Skin: Negative for rash.   Neurological: Negative for dizziness, weakness and headache.   Psychiatric/Behavioral: Negative for sleep disturbance and depressed mood.          Current Outpatient Medications:   •  lisinopril (PRINIVIL,ZESTRIL) 10 MG tablet, Take 1 tablet by mouth Every Morning., Disp: 90 tablet, Rfl: 0  •  Proventil  (90 Base) MCG/ACT inhaler, Inhale 2 puffs Every 4 (Four) Hours As Needed for Wheezing., Disp: 8 g, Rfl: 1    Vital Signs:   Vitals:    04/11/23 0833 04/11/23 0851   BP: 150/87 152/82   BP Location: Right arm Right arm   Patient Position: Sitting Sitting   Cuff Size: Adult Adult   Pulse: 69 75   Resp: 16    SpO2: Comment: room air    Weight: 74.9 kg (165 lb 3.2 oz)    Height: 158.8 cm (62.5\")    PainSc: 0-No pain          Physical Exam:  Physical Exam  Vitals reviewed.   Constitutional:       General: She is not in acute distress.     Appearance: Normal appearance. She is well-developed.   HENT:      Head: " Normocephalic. Hair is normal.      Right Ear: Hearing, tympanic membrane, ear canal and external ear normal. No decreased hearing noted. No drainage.      Left Ear: Hearing, tympanic membrane, ear canal and external ear normal. No decreased hearing noted.      Nose: Nose normal. No nasal deformity.      Mouth/Throat:      Mouth: Mucous membranes are moist.   Eyes:      General: Lids are normal.      Extraocular Movements: Extraocular movements intact.      Conjunctiva/sclera: Conjunctivae normal.      Pupils: Pupils are equal, round, and reactive to light.   Neck:      Thyroid: No thyromegaly.      Vascular: No carotid bruit or JVD.   Cardiovascular:      Rate and Rhythm: Normal rate and regular rhythm.      Pulses: Normal pulses.      Heart sounds: Normal heart sounds. No murmur heard.    No friction rub. No gallop.   Pulmonary:      Effort: Pulmonary effort is normal. No respiratory distress.      Breath sounds: Normal breath sounds. No wheezing.   Chest:   Breasts:     Right: Normal. No mass, nipple discharge or skin change.      Left: Normal. No mass, nipple discharge or skin change.   Abdominal:      General: Bowel sounds are normal.      Palpations: Abdomen is soft. There is no mass.      Tenderness: There is no abdominal tenderness.   Musculoskeletal:         General: No tenderness or deformity. Normal range of motion.      Cervical back: Normal range of motion and neck supple.   Lymphadenopathy:      Cervical: No cervical adenopathy.      Upper Body:      Right upper body: No axillary adenopathy.      Left upper body: No axillary adenopathy.   Skin:     General: Skin is warm and dry.      Findings: No erythema or rash.   Neurological:      Mental Status: She is alert and oriented to person, place, and time.      Motor: No abnormal muscle tone.      Gait: Gait normal.      Deep Tendon Reflexes: Reflexes are normal and symmetric.   Psychiatric:         Mood and Affect: Mood normal.         Behavior: Behavior  normal.         Thought Content: Thought content normal.         Judgment: Judgment normal.         Result Review      The following data was reviewed by: ABNER La on 04/11/2023:    Results for orders placed or performed in visit on 10/11/22   Comprehensive Metabolic Panel    Specimen: Blood   Result Value Ref Range    Glucose 92 65 - 99 mg/dL    BUN 13 6 - 20 mg/dL    Creatinine 0.66 0.57 - 1.00 mg/dL    Sodium 139 136 - 145 mmol/L    Potassium 4.0 3.5 - 5.2 mmol/L    Chloride 102 98 - 107 mmol/L    CO2 29.4 (H) 22.0 - 29.0 mmol/L    Calcium 9.3 8.6 - 10.5 mg/dL    Total Protein 7.0 6.0 - 8.5 g/dL    Albumin 4.50 3.50 - 5.20 g/dL    ALT (SGPT) 17 1 - 33 U/L    AST (SGOT) 15 1 - 32 U/L    Alkaline Phosphatase 85 39 - 117 U/L    Total Bilirubin 0.2 0.0 - 1.2 mg/dL    Globulin 2.5 gm/dL    A/G Ratio 1.8 g/dL    BUN/Creatinine Ratio 19.7 7.0 - 25.0    Anion Gap 7.6 5.0 - 15.0 mmol/L    eGFR 107.0 >60.0 mL/min/1.73   Lipid Panel    Specimen: Blood   Result Value Ref Range    Total Cholesterol 241 (H) 0 - 200 mg/dL    Triglycerides 171 (H) 0 - 150 mg/dL    HDL Cholesterol 71 (H) 40 - 60 mg/dL    LDL Cholesterol  140 (H) 0 - 100 mg/dL    VLDL Cholesterol 30 5 - 40 mg/dL    LDL/HDL Ratio 1.91                Assessment and Plan:          Diagnoses and all orders for this visit:    1. Routine general medical examination at a health care facility (Primary)  Assessment & Plan:  Advised covid vaccine booster, declines today   Advised to check with her pharmacy on Tdap (had a grandson born recently still in hospital)   Follow a healthy diet and get regular exercise.  Always wear seat belts.        Orders:  -     Comprehensive Metabolic Panel; Future  -     Lipid Panel  -     TSH Rfx On Abnormal To Free T4; Future  -     CBC w AUTO Differential; Future    2. Screening for viral disease  Assessment & Plan:  Screen for hep c     Orders:  -     Hepatitis C antibody; Future    3. Essential (primary)  hypertension  Assessment & Plan:  She has a bp monitor in her storage unit, she was up last night with her new grand baby, to return fasting for labs, check her bp     Orders:  -     Comprehensive Metabolic Panel; Future  -     Lipid Panel  -     lisinopril (PRINIVIL,ZESTRIL) 10 MG tablet; Take 1 tablet by mouth Every Morning.  Dispense: 90 tablet; Refill: 0    4. Screen for colon cancer  Assessment & Plan:  Advised screening CLN, is going to consider this and let me know       5. Screening mammogram for breast cancer  Assessment & Plan:  Continue monthly BSE, advised a screening mammogram, she is considering and will let me know           Follow Up   Return for fasting for labs.  Patient was given instructions and counseling regarding her condition or for health maintenance advice. Please see specific information pulled into the AVS if appropriate.

## 2023-04-11 NOTE — ASSESSMENT & PLAN NOTE
She has a bp monitor in her storage unit, she was up last night with her new grand baby, to return fasting for labs, check her bp

## 2023-04-11 NOTE — ASSESSMENT & PLAN NOTE
Advised covid vaccine booster, declines today   Advised to check with her pharmacy on Tdap (had a grandson born recently still in hospital)   Follow a healthy diet and get regular exercise.  Always wear seat belts.

## 2023-04-24 ENCOUNTER — TELEPHONE (OUTPATIENT)
Dept: FAMILY MEDICINE CLINIC | Age: 53
End: 2023-04-24
Payer: COMMERCIAL

## 2023-04-24 NOTE — TELEPHONE ENCOUNTER
----- Message from Paula Carrasco LPN sent at 4/17/2023  8:15 AM EDT -----      ----- Message -----  From: SYSTEM  Sent: 4/16/2023   1:26 AM EDT  To: [facility] [place] [facility] [place]

## 2023-05-01 NOTE — TELEPHONE ENCOUNTER
LMTRC. 2nd attempt. Will send letter and postpone for a year  Hub can read: patient has overdue lab orders in chart.

## 2023-07-26 ENCOUNTER — OFFICE VISIT (OUTPATIENT)
Dept: FAMILY MEDICINE CLINIC | Age: 53
End: 2023-07-26
Payer: COMMERCIAL

## 2023-07-26 VITALS
OXYGEN SATURATION: 97 % | HEART RATE: 70 BPM | HEIGHT: 63 IN | TEMPERATURE: 98.6 F | SYSTOLIC BLOOD PRESSURE: 140 MMHG | DIASTOLIC BLOOD PRESSURE: 70 MMHG | BODY MASS INDEX: 29.77 KG/M2 | WEIGHT: 168 LBS

## 2023-07-26 DIAGNOSIS — J02.9 SORE THROAT: Primary | ICD-10-CM

## 2023-07-26 DIAGNOSIS — I10 ESSENTIAL (PRIMARY) HYPERTENSION: ICD-10-CM

## 2023-07-26 LAB
EXPIRATION DATE: NORMAL
INTERNAL CONTROL: NORMAL
Lab: NORMAL
S PYO AG THROAT QL: NEGATIVE

## 2023-07-26 PROCEDURE — 87880 STREP A ASSAY W/OPTIC: CPT

## 2023-07-26 PROCEDURE — 99213 OFFICE O/P EST LOW 20 MIN: CPT

## 2023-07-26 PROCEDURE — 87081 CULTURE SCREEN ONLY: CPT

## 2023-07-26 RX ORDER — LISINOPRIL 10 MG/1
10 TABLET ORAL EVERY MORNING
Qty: 90 TABLET | Refills: 0 | Status: SHIPPED | OUTPATIENT
Start: 2023-07-26

## 2023-07-26 RX ORDER — LISINOPRIL 10 MG/1
10 TABLET ORAL EVERY MORNING
Qty: 90 TABLET | Refills: 0 | Status: SHIPPED | OUTPATIENT
Start: 2023-07-26 | End: 2023-07-26 | Stop reason: SDUPTHER

## 2023-07-26 NOTE — PROGRESS NOTES
"Subjective     CHIEF COMPLAINT    Chief Complaint   Patient presents with    Sore Throat     Right side of throat sore x 5 days, swollen lymph node     History of Present Illness  Patient is a 52-year-old female who presents to the clinic today with complaints of sore throat.  States this has been present for a few days.  Denies any fever or chills.  Denies any congestion, rhinorrhea, cough.  Denies any shortness of breath, wheezing or chest pain.  States she has no other symptoms other than her sore throat.  She has been using ibuprofen, warm salt water gargles.  Patient thought this may be related to allergies.  She does not take any allergy medication.  No known exposures to any illnesses.      Review of Systems   Constitutional:  Negative for chills and fever.   HENT:  Positive for sore throat. Negative for congestion, rhinorrhea and trouble swallowing.    Respiratory:  Negative for cough, shortness of breath and wheezing.    Cardiovascular:  Negative for chest pain.        No Known Allergies    Current Outpatient Medications on File Prior to Visit   Medication Sig Dispense Refill    lisinopril (PRINIVIL,ZESTRIL) 10 MG tablet Take 1 tablet by mouth Every Morning. 90 tablet 0    Proventil  (90 Base) MCG/ACT inhaler Inhale 2 puffs Every 4 (Four) Hours As Needed for Wheezing. 8 g 1     No current facility-administered medications on file prior to visit.     /70 (BP Location: Right arm, Patient Position: Sitting, Cuff Size: Adult)   Pulse 70   Temp 98.6 °F (37 °C) (Oral)   Ht 158.8 cm (62.52\")   Wt 76.2 kg (168 lb)   SpO2 97%   BMI 30.22 kg/m²       Objective     Physical Exam  Vitals and nursing note reviewed.   Constitutional:       General: She is not in acute distress.     Appearance: Normal appearance. She is not ill-appearing.   HENT:      Head: Normocephalic and atraumatic.      Right Ear: Tympanic membrane, ear canal and external ear normal. A middle ear effusion is present.      Left Ear: " Tympanic membrane, ear canal and external ear normal.      Ears:      Comments: No evidence of infection to bilateral ears     Nose: Nose normal.      Right Sinus: No maxillary sinus tenderness or frontal sinus tenderness.      Left Sinus: No maxillary sinus tenderness or frontal sinus tenderness.      Mouth/Throat:      Lips: Pink.      Mouth: Mucous membranes are moist.      Pharynx: Oropharynx is clear. Uvula midline. Posterior oropharyngeal erythema present. No pharyngeal swelling, oropharyngeal exudate or uvula swelling.   Eyes:      Extraocular Movements: Extraocular movements intact.      Pupils: Pupils are equal, round, and reactive to light.   Cardiovascular:      Rate and Rhythm: Normal rate and regular rhythm.      Heart sounds: Normal heart sounds. No murmur heard.  Pulmonary:      Effort: Pulmonary effort is normal. No accessory muscle usage or respiratory distress.      Breath sounds: Normal breath sounds. No wheezing or rhonchi.   Lymphadenopathy:      Cervical: No cervical adenopathy.   Skin:     General: Skin is warm and dry.   Neurological:      General: No focal deficit present.      Mental Status: She is alert and oriented to person, place, and time.   Psychiatric:         Mood and Affect: Mood and affect normal.        Lab Results (last 24 hours)       Procedure Component Value Units Date/Time    POCT rapid strep A [[id number]] Collected: 07/26/23 0930    Specimen: Swab Updated: 07/26/23 0931     Rapid Strep A Screen Negative     Internal Control Passed     Lot Number [id number]     Expiration Date 11/30/24    Beta Strep Culture, Throat - Swab, Throat [[id number]] Collected: 07/26/23 0931    Specimen: Swab from Throat Updated: 07/26/23 1035             Diagnoses and all orders for this visit:    1. Sore throat (Primary)  -     POCT rapid strep A  -     Beta Strep Culture, Throat - , Throat; Future  -     Beta Strep Culture, Throat - Swab, Throat    Patient is negative for strep on rapid testing  today.  Strep culture sent and pending.  No evidence of bacterial infection today.  Symptoms are likely viral or related to allergies.  Recommend symptomatic treatment.  I did advise patient she could take Flonase over-the-counter, she declines me sending this to the pharmacy for her.  Can also trial antihistamine OTC.  Recommend warm salt water gargles, Tylenol and ibuprofen.  Return to clinic if symptoms worsen or do not improve.  Proceed to ER for any shortness of breath, chest pain or ability to swallow.       Follow up:  Return if symptoms worsen or fail to improve.  Patient was given instructions and counseling regarding her condition or for health maintenance advice. Please see specific information pulled into the AVS if appropriate.

## 2023-07-26 NOTE — PROGRESS NOTES
She saw BM today and she asked for her  bp rx refill, I sent to her pharm, [facility] I believe, but I ordered  fasting labs, not done, she needs to get done

## 2023-07-28 LAB — BACTERIA SPEC AEROBE CULT: NORMAL

## 2023-10-11 ENCOUNTER — LAB (OUTPATIENT)
Dept: LAB | Facility: HOSPITAL | Age: 53
End: 2023-10-11
Payer: COMMERCIAL

## 2023-10-11 ENCOUNTER — OFFICE VISIT (OUTPATIENT)
Dept: FAMILY MEDICINE CLINIC | Age: 53
End: 2023-10-11
Payer: COMMERCIAL

## 2023-10-11 VITALS
HEIGHT: 63 IN | WEIGHT: 165 LBS | SYSTOLIC BLOOD PRESSURE: 161 MMHG | BODY MASS INDEX: 29.23 KG/M2 | HEART RATE: 71 BPM | TEMPERATURE: 98 F | DIASTOLIC BLOOD PRESSURE: 88 MMHG

## 2023-10-11 DIAGNOSIS — I10 ESSENTIAL (PRIMARY) HYPERTENSION: Primary | ICD-10-CM

## 2023-10-11 DIAGNOSIS — Z00.00 ROUTINE GENERAL MEDICAL EXAMINATION AT A HEALTH CARE FACILITY: ICD-10-CM

## 2023-10-11 DIAGNOSIS — Z11.59 SCREENING FOR VIRAL DISEASE: ICD-10-CM

## 2023-10-11 DIAGNOSIS — I10 ESSENTIAL (PRIMARY) HYPERTENSION: ICD-10-CM

## 2023-10-11 LAB
ALBUMIN SERPL-MCNC: 4.5 G/DL (ref 3.5–5.2)
ALBUMIN/GLOB SERPL: 1.7 G/DL
ALP SERPL-CCNC: 85 U/L (ref 39–117)
ALT SERPL W P-5'-P-CCNC: 22 U/L (ref 1–33)
ANION GAP SERPL CALCULATED.3IONS-SCNC: 6 MMOL/L (ref 5–15)
AST SERPL-CCNC: 19 U/L (ref 1–32)
BASOPHILS # BLD AUTO: 0.04 10*3/MM3 (ref 0–0.2)
BASOPHILS NFR BLD AUTO: 0.5 % (ref 0–1.5)
BILIRUB SERPL-MCNC: 0.2 MG/DL (ref 0–1.2)
BUN SERPL-MCNC: 11 MG/DL (ref 6–20)
BUN/CREAT SERPL: 16.2 (ref 7–25)
CALCIUM SPEC-SCNC: 9.8 MG/DL (ref 8.6–10.5)
CHLORIDE SERPL-SCNC: 102 MMOL/L (ref 98–107)
CHOLEST SERPL-MCNC: 245 MG/DL (ref 0–200)
CO2 SERPL-SCNC: 30 MMOL/L (ref 22–29)
CREAT SERPL-MCNC: 0.68 MG/DL (ref 0.57–1)
DEPRECATED RDW RBC AUTO: 40.5 FL (ref 37–54)
EGFRCR SERPLBLD CKD-EPI 2021: 105.6 ML/MIN/1.73
EOSINOPHIL # BLD AUTO: 0.42 10*3/MM3 (ref 0–0.4)
EOSINOPHIL NFR BLD AUTO: 5.2 % (ref 0.3–6.2)
ERYTHROCYTE [DISTWIDTH] IN BLOOD BY AUTOMATED COUNT: 12.1 % (ref 12.3–15.4)
GLOBULIN UR ELPH-MCNC: 2.7 GM/DL
GLUCOSE SERPL-MCNC: 111 MG/DL (ref 65–99)
HCT VFR BLD AUTO: 43.5 % (ref 34–46.6)
HCV AB SER DONR QL: NORMAL
HDLC SERPL-MCNC: 60 MG/DL (ref 40–60)
HGB BLD-MCNC: 14.5 G/DL (ref 12–15.9)
IMM GRANULOCYTES # BLD AUTO: 0.03 10*3/MM3 (ref 0–0.05)
IMM GRANULOCYTES NFR BLD AUTO: 0.4 % (ref 0–0.5)
LDLC SERPL CALC-MCNC: 156 MG/DL (ref 0–100)
LDLC/HDLC SERPL: 2.55 {RATIO}
LYMPHOCYTES # BLD AUTO: 1.89 10*3/MM3 (ref 0.7–3.1)
LYMPHOCYTES NFR BLD AUTO: 23.4 % (ref 19.6–45.3)
MCH RBC QN AUTO: 30 PG (ref 26.6–33)
MCHC RBC AUTO-ENTMCNC: 33.3 G/DL (ref 31.5–35.7)
MCV RBC AUTO: 90.1 FL (ref 79–97)
MONOCYTES # BLD AUTO: 0.62 10*3/MM3 (ref 0.1–0.9)
MONOCYTES NFR BLD AUTO: 7.7 % (ref 5–12)
NEUTROPHILS NFR BLD AUTO: 5.09 10*3/MM3 (ref 1.7–7)
NEUTROPHILS NFR BLD AUTO: 62.8 % (ref 42.7–76)
PLATELET # BLD AUTO: 289 10*3/MM3 (ref 140–450)
PMV BLD AUTO: 8.6 FL (ref 6–12)
POTASSIUM SERPL-SCNC: 4.6 MMOL/L (ref 3.5–5.2)
PROT SERPL-MCNC: 7.2 G/DL (ref 6–8.5)
RBC # BLD AUTO: 4.83 10*6/MM3 (ref 3.77–5.28)
SODIUM SERPL-SCNC: 138 MMOL/L (ref 136–145)
TRIGL SERPL-MCNC: 160 MG/DL (ref 0–150)
TSH SERPL DL<=0.05 MIU/L-ACNC: 1.29 UIU/ML (ref 0.27–4.2)
VLDLC SERPL-MCNC: 29 MG/DL (ref 5–40)
WBC NRBC COR # BLD: 8.09 10*3/MM3 (ref 3.4–10.8)

## 2023-10-11 PROCEDURE — 99213 OFFICE O/P EST LOW 20 MIN: CPT | Performed by: NURSE PRACTITIONER

## 2023-10-11 PROCEDURE — 36415 COLL VENOUS BLD VENIPUNCTURE: CPT

## 2023-10-11 PROCEDURE — 80061 LIPID PANEL: CPT | Performed by: NURSE PRACTITIONER

## 2023-10-11 PROCEDURE — 86803 HEPATITIS C AB TEST: CPT

## 2023-10-11 PROCEDURE — 80050 GENERAL HEALTH PANEL: CPT

## 2023-10-11 RX ORDER — LISINOPRIL 10 MG/1
10 TABLET ORAL EVERY MORNING
Qty: 90 TABLET | Refills: 1 | Status: SHIPPED | OUTPATIENT
Start: 2023-10-11

## 2023-10-11 NOTE — PROGRESS NOTES
Chief Complaint  Hypertension (6 month follow up )    Subjective          Kiley CASIMIRO Meme presents to [facility]    History of Present Illness  Hypertension:  Current medication: lisinopril   Tolerating Medication: Yes/ missed her dose this am of rx, has with her   Checking BP at home and it is: not checking   Needs refills: Yes to medica   Labs:  Lab Results       Component                Value               Date                       GLUCOSE                  92                  10/11/2022                 BUN                      13                  10/11/2022                 CREATININE               0.66                10/11/2022                 EGFRIFNONA               67                  09/09/2021                 BCR                      19.7                10/11/2022                 K                        4.0                 10/11/2022                 CO2                      29.4 (H)            10/11/2022                 CALCIUM                  9.3                 10/11/2022                 ALBUMIN                  4.50                10/11/2022                 LABIL2                   1.8                 06/24/2020                 AST                      15                  10/11/2022                 ALT                      17                  10/11/2022              PAST MEDICAL HISTORY   Changes since 4-:         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      Hysterectomy: 8-9-16; lap hysterectomy, bilateral tubes/cystoscopy /benign/fibroids;     Procedures: hysteroscopy/endometrial biopsy 4-2016         Family History:     Father: Hypertension;  Cerebrovascular Accident     Mother: Hypertension;  Type 2 Diabetes     Brother(s): 0  brother(s) total     Sister(s): 0 sister(s) total     Paternal Grandfather: COPD     Paternal Grandmother: Hypertension         Social History:     Occupation: [employer]  / [employer] part time as      Marital Status:       Children: 3 children           Past Medical History:   Diagnosis Date    Acute bronchitis, unspecified     Acute URI     Asthma     Breast mass     Depression with anxiety     Essential (primary) hypertension     History of noncompliance with medical treatment     Mild intermittent asthma with (acute) exacerbation     Mixed hyperlipidemia     Other specified disorders of the skin and subcutaneous tissue     Persistent mood (affective) disorder, unspecified     Solitary pulmonary nodule        No Known Allergies     Past Surgical History:   Procedure Laterality Date    CYSTOSCOPY      BENIGN  FIBROIDS    ENDOMETRIAL BIOPSY  2016    HYSTERECTOMY  2016    LAP    LEEP  2000    TUBAL ABDOMINAL LIGATION Bilateral         Social History     Tobacco Use    Smoking status: Former     Types: Cigarettes     Quit date:      Years since quittin.7     Passive exposure: Never    Smokeless tobacco: Never   Substance Use Topics    Alcohol use: Not Currently     Comment: QUIT 7/15       Family History   Problem Relation Age of Onset    Hypertension Mother     Diabetes type II Mother     Hypertension Father     Stroke Father     Hypertension Paternal Grandmother     COPD Paternal Grandfather         Health Maintenance Due   Topic Date Due    MAMMOGRAM  Never done    COLORECTAL CANCER SCREENING  Never done    Pneumococcal Vaccine 0-64 (1 - PCV) Never done    TDAP/TD VACCINES (1 - Tdap) Never done    HEPATITIS C SCREENING  Never done    ZOSTER VACCINE (1 of 2) Never done    LIPID PANEL  10/11/2023        Current Outpatient Medications on File Prior to Visit   Medication Sig    Proventil  (90 Base) MCG/ACT inhaler Inhale 2 puffs Every 4 (Four) Hours As Needed for  "Wheezing.    [DISCONTINUED] lisinopril (PRINIVIL,ZESTRIL) 10 MG tablet Take 1 tablet by mouth Every Morning. PLEASE COMPLETE LABS PRIOR TO APPT 10/11/23     No current facility-administered medications on file prior to visit.       Immunization History   Administered Date(s) Administered    COVID-19 (PFIZER) Purple Cap Monovalent 07/11/2021, 08/01/2021    Fluzone (or Fluarix & Flulaval for VFC) >6mos 09/29/2023    Hepatitis A 05/05/2018, 11/12/2018    Influenza Injectable Mdck Pf Quad 09/27/2021, 09/23/2022    Influenza, Unspecified 10/22/2019, 09/27/2021, 09/23/2022       Review of Systems   Constitutional:  Negative for fatigue and fever.   Respiratory:  Negative for cough and shortness of breath.    Cardiovascular:  Negative for chest pain, palpitations and leg swelling.   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         Objective     Vitals:    10/11/23 0850   BP: 161/88   BP Location: Left arm   Patient Position: Sitting   Cuff Size: Large Adult   Pulse: 71   Temp: 98 øF (36.7 øC)   TempSrc: Oral   Weight: 74.8 kg (165 lb)   Height: 158.8 cm (62.52\")            Physical Exam  Vitals reviewed.   Constitutional:       General: She is not in acute distress.     Appearance: Normal appearance.   Neck:      Vascular: No carotid bruit.   Cardiovascular:      Rate and Rhythm: Normal rate and regular rhythm.      Heart sounds: Normal heart sounds. No murmur heard.  Pulmonary:      Effort: Pulmonary effort is normal. No respiratory distress.      Breath sounds: Normal breath sounds.   Musculoskeletal:      Right lower leg: No edema.      Left lower leg: No edema.   Neurological:      Mental Status: She is alert.   Psychiatric:         Mood and Affect: Mood normal.         Behavior: Behavior normal.         Result Review :     The following data was reviewed by: ABNER La on 10/11/2023:              "          Assessment and Plan      Diagnoses and all orders for this visit:    1. Essential (primary) hypertension (Primary)  Assessment & Plan:  BP up, take her rx, gave her a log sheet, monitor and bring in a copy in a few weeks.  Continue current med. Continue to modify diet and lifestyle.   She has had flu vaccine, declines covid vaccine today     Orders:  -     Cancel: Comprehensive Metabolic Panel  -     Cancel: Lipid Panel  -     lisinopril (PRINIVIL,ZESTRIL) 10 MG tablet; Take 1 tablet by mouth Every Morning. PLEASE COMPLETE LABS PRIOR TO APPT 10/11/23  Dispense: 90 tablet; Refill: 1                  Follow Up     Return for followup pending lab results.    Patient was given instructions and counseling regarding her condition or for health maintenance advice. Please see specific information pulled into the AVS if appropriate.

## 2023-10-11 NOTE — ASSESSMENT & PLAN NOTE
BP up, take her rx, gave her a log sheet, monitor and bring in a copy in a few weeks.  Continue current med. Continue to modify diet and lifestyle.   She has had flu vaccine, declines covid vaccine today

## 2024-02-22 ENCOUNTER — OFFICE VISIT (OUTPATIENT)
Dept: FAMILY MEDICINE CLINIC | Age: 54
End: 2024-02-22
Payer: COMMERCIAL

## 2024-02-22 VITALS
HEIGHT: 63 IN | SYSTOLIC BLOOD PRESSURE: 155 MMHG | DIASTOLIC BLOOD PRESSURE: 84 MMHG | BODY MASS INDEX: 30.3 KG/M2 | WEIGHT: 171 LBS | TEMPERATURE: 97.7 F | HEART RATE: 89 BPM

## 2024-02-22 DIAGNOSIS — R05.1 ACUTE COUGH: ICD-10-CM

## 2024-02-22 DIAGNOSIS — I10 ESSENTIAL (PRIMARY) HYPERTENSION: ICD-10-CM

## 2024-02-22 DIAGNOSIS — J45.20 MILD INTERMITTENT ASTHMA WITHOUT COMPLICATION: ICD-10-CM

## 2024-02-22 DIAGNOSIS — J20.9 ACUTE BRONCHITIS, UNSPECIFIED ORGANISM: Primary | ICD-10-CM

## 2024-02-22 DIAGNOSIS — J02.9 SORE THROAT: ICD-10-CM

## 2024-02-22 LAB
EXPIRATION DATE: NORMAL
EXPIRATION DATE: NORMAL
FLUAV AG UPPER RESP QL IA.RAPID: NOT DETECTED
FLUBV AG UPPER RESP QL IA.RAPID: NOT DETECTED
INTERNAL CONTROL: NORMAL
INTERNAL CONTROL: NORMAL
Lab: NORMAL
Lab: NORMAL
S PYO AG THROAT QL: NEGATIVE
SARS-COV-2 AG UPPER RESP QL IA.RAPID: NOT DETECTED

## 2024-02-22 PROCEDURE — 99213 OFFICE O/P EST LOW 20 MIN: CPT | Performed by: FAMILY MEDICINE

## 2024-02-22 PROCEDURE — 87880 STREP A ASSAY W/OPTIC: CPT | Performed by: FAMILY MEDICINE

## 2024-02-22 PROCEDURE — 87428 SARSCOV & INF VIR A&B AG IA: CPT | Performed by: FAMILY MEDICINE

## 2024-02-22 PROCEDURE — 87081 CULTURE SCREEN ONLY: CPT | Performed by: FAMILY MEDICINE

## 2024-02-22 RX ORDER — AZITHROMYCIN 250 MG/1
TABLET, FILM COATED ORAL
Qty: 6 TABLET | Refills: 0 | Status: SHIPPED | OUTPATIENT
Start: 2024-02-22

## 2024-02-22 RX ORDER — ALBUTEROL SULFATE 90 UG/1
2 AEROSOL, METERED RESPIRATORY (INHALATION) EVERY 4 HOURS PRN
Qty: 8 G | Refills: 1 | Status: SHIPPED | OUTPATIENT
Start: 2024-02-22

## 2024-02-22 NOTE — PROGRESS NOTES
"Chief Complaint  Cough and Sore Throat    Subjective          Kiley Valentine presents to [facility]  History of Present Illness    Please see the student note from today.  Patient history, exam, and assessment independently verified.    As noted in the student note patient's been symptomatic for 12 days.    Current Outpatient Medications on File Prior to Visit   Medication Sig Dispense Refill    lisinopril (PRINIVIL,ZESTRIL) 10 MG tablet Take 1 tablet by mouth Every Morning. PLEASE COMPLETE LABS PRIOR TO APPT 10/11/23 90 tablet 1     No current facility-administered medications on file prior to visit.       Review of Systems         Objective   Vital Signs:   /84 (BP Location: Right arm, Patient Position: Sitting)   Pulse 89   Temp 97.7 °F (36.5 °C) (Temporal)   Ht 158.8 cm (62.52\")   Wt 77.6 kg (171 lb)   BMI 30.76 kg/m²     Physical Exam  Constitutional:       Appearance: Normal appearance.   Neck:      Vascular: No carotid bruit.   Cardiovascular:      Rate and Rhythm: Normal rate and regular rhythm.      Heart sounds: Normal heart sounds. No murmur heard.  Pulmonary:      Effort: No respiratory distress.      Breath sounds: No wheezing or rales.   Musculoskeletal:      Right lower leg: No edema.      Left lower leg: No edema.   Lymphadenopathy:      Cervical: No cervical adenopathy.   Neurological:      General: No focal deficit present.      Mental Status: She is alert.   Psychiatric:         Attention and Perception: Attention normal.         Mood and Affect: Mood normal.         Speech: Speech normal.            Result Review :                     Assessment and Plan    Diagnoses and all orders for this visit:    1. Acute bronchitis, unspecified organism (Primary)  Assessment & Plan:  Since he has had prolonged course of symptoms we will go ahead and treat her with antibiotics.  Also give albuterol for symptomatic relief.    Orders:  -     azithromycin (ZITHROMAX) 250 " MG tablet; Take 2 tablets PO the first day, then 1 tablet PO daily for 4 days.  Dispense: 6 tablet; Refill: 0  -     albuterol sulfate HFA (Proventil HFA) 108 (90 Base) MCG/ACT inhaler; Inhale 2 puffs Every 4 (Four) Hours As Needed for Wheezing.  Dispense: 8 g; Refill: 1    2. Mild intermittent asthma without complication  Assessment & Plan:  Does have the history of mild intermittent asthma.  Treat with albuterol and placed on antibiotics           Orders:  -     azithromycin (ZITHROMAX) 250 MG tablet; Take 2 tablets PO the first day, then 1 tablet PO daily for 4 days.  Dispense: 6 tablet; Refill: 0  -     albuterol sulfate HFA (Proventil HFA) 108 (90 Base) MCG/ACT inhaler; Inhale 2 puffs Every 4 (Four) Hours As Needed for Wheezing.  Dispense: 8 g; Refill: 1    3. Acute cough  -     POCT SARS-CoV-2 Antigen YOVANI + Flu    4. Sore throat  -     POCT rapid strep A  -     Beta Strep Culture, Throat - , Throat; Future  -     Beta Strep Culture, Throat - Swab, Throat    5. Essential (primary) hypertension  Assessment & Plan:  Pointed out that her blood pressure is little elevated today.  She is not feeling well so were not can make any adjustments and defer to her follow-up with PCP in April.            Follow Up   No follow-ups on file.  Patient was given instructions and counseling regarding her condition or for health maintenance advice. Please see specific information pulled into the AVS if appropriate.

## 2024-02-22 NOTE — PROGRESS NOTES
"Chief Complaint  Cough and Sore Throat    Subjective          Kiley Valentine 53 y.o. presents to [facility]      History of Present Illness    Presents with 12 days of congestion, fatigue, productive cough. Originally felt more sinus pressure and congestion. Now feeling more pleuritic pain and mild dyspnea. Has a history of asthma well controlled with albuterol inhaler PRN. The past week has been using her albuterol inhaler a couple times a day. Has not had a fever. Reports a few family members with the flu, she says she has had minimal contact with them.     Had a few episodes of diarrhea throughout the 12 days. Now having solid BMs. No abdominal pain.     Rapid COVID, flu, strep negative. Strep culture collected.      Current Outpatient Medications on File Prior to Visit   Medication Sig Dispense Refill    lisinopril (PRINIVIL,ZESTRIL) 10 MG tablet Take 1 tablet by mouth Every Morning. PLEASE COMPLETE LABS PRIOR TO APPT 10/11/23 90 tablet 1    Proventil  (90 Base) MCG/ACT inhaler Inhale 2 puffs Every 4 (Four) Hours As Needed for Wheezing. 8 g 1     No current facility-administered medications on file prior to visit.       Review of Systems   See HPI, no additional complaints      Objective   Vital Signs:   /84 (BP Location: Right arm, Patient Position: Sitting)   Pulse 89   Temp 97.7 °F (36.5 °C) (Temporal)   Ht 158.8 cm (62.52\")   Wt 77.6 kg (171 lb)   BMI 30.76 kg/m²     Physical Exam     Appears flushed and warm today  Tympanic membranes clear  No tenderness to palpation of sinuses  Oral pharynx mildly erythematous   Bilateral breath sounds clear, no wheezing or rales   No lymphadenopathy   Abdomen soft       Result Review :              Assessment and Plan     1) Sinus congestion and associated productive cough   - Differential includes viral bronchitis, viral upper respiratory infection, bacterial sinus infection   - considering prolonged course with little " improvement after consistent use of albuterol will prescribe azithromycin today   - will f/u with results of strep culture   - refill albuterol inhaler       Pili Murray, MS3

## 2024-02-24 LAB — BACTERIA SPEC AEROBE CULT: NORMAL

## 2024-02-26 NOTE — ASSESSMENT & PLAN NOTE
Since he has had prolonged course of symptoms we will go ahead and treat her with antibiotics.  Also give albuterol for symptomatic relief.

## 2024-02-26 NOTE — ASSESSMENT & PLAN NOTE
Pointed out that her blood pressure is little elevated today.  She is not feeling well so were not can make any adjustments and defer to her follow-up with PCP in April.

## 2024-02-26 NOTE — ASSESSMENT & PLAN NOTE
Does have the history of mild intermittent asthma.  Treat with albuterol and placed on antibiotics

## 2024-02-27 DIAGNOSIS — J45.20 MILD INTERMITTENT ASTHMA WITHOUT COMPLICATION: ICD-10-CM

## 2024-02-27 DIAGNOSIS — J20.9 ACUTE BRONCHITIS, UNSPECIFIED ORGANISM: ICD-10-CM

## 2024-02-27 RX ORDER — AZITHROMYCIN 250 MG/1
TABLET, FILM COATED ORAL
Qty: 6 TABLET | Refills: 0 | Status: SHIPPED | OUTPATIENT
Start: 2024-02-27

## 2024-02-27 NOTE — TELEPHONE ENCOUNTER
Caller: Kiley Valentine    Relationship: Self    Best call back number: [phone]    Requested Prescriptions:   Requested Prescriptions     Pending Prescriptions Disp Refills    azithromycin (ZITHROMAX) 250 MG tablet 6 tablet 0     Sig: Take 2 tablets PO the first day, then 1 tablet PO daily for 4 days.        Pharmacy where request should be sent: [facility] [address] [facility] [phone] [facility] [phone]      Last office visit with prescribing clinician: 2/22/2024   Last telemedicine visit with prescribing clinician: Visit date not found   Next office visit with prescribing clinician: Visit date not found     Additional details provided by patient: PATIENT WAS SEEN ON THE 02/22/2024 AND PRESCRIBED THIS MEDICATION AND SHE HAS FINISHED THE PRESCRIPTION  SHE STILL HAS SOME LINGERING SYMPTOMS, COUGH AND CHEST CONGESTION AND FEELS A REFILL WOULD TAKE CARE OF THIS     Does the patient have less than a 3 day supply:  [x] Yes  [] No    Would you like a call back once the refill request has been completed: [] Yes [x] No    If the office needs to give you a call back, can they leave a voicemail: [] Yes [x] No    Chilango Cook Rep   02/27/24 12:12 EST

## 2024-04-18 DIAGNOSIS — I10 ESSENTIAL (PRIMARY) HYPERTENSION: ICD-10-CM

## 2024-04-18 RX ORDER — LISINOPRIL 10 MG/1
TABLET ORAL
Qty: 30 TABLET | Refills: 0 | Status: SHIPPED | OUTPATIENT
Start: 2024-04-18

## 2024-05-13 ENCOUNTER — OFFICE VISIT (OUTPATIENT)
Dept: FAMILY MEDICINE CLINIC | Age: 54
End: 2024-05-13
Payer: COMMERCIAL

## 2024-05-13 ENCOUNTER — LAB (OUTPATIENT)
Dept: LAB | Facility: HOSPITAL | Age: 54
End: 2024-05-13
Payer: COMMERCIAL

## 2024-05-13 VITALS
HEIGHT: 66 IN | BODY MASS INDEX: 27.16 KG/M2 | DIASTOLIC BLOOD PRESSURE: 95 MMHG | WEIGHT: 169 LBS | HEART RATE: 76 BPM | SYSTOLIC BLOOD PRESSURE: 148 MMHG

## 2024-05-13 DIAGNOSIS — R25.2 LEG CRAMPS: ICD-10-CM

## 2024-05-13 DIAGNOSIS — Z12.11 SCREEN FOR COLON CANCER: ICD-10-CM

## 2024-05-13 DIAGNOSIS — Z12.31 SCREENING MAMMOGRAM FOR BREAST CANCER: ICD-10-CM

## 2024-05-13 DIAGNOSIS — I10 ESSENTIAL (PRIMARY) HYPERTENSION: ICD-10-CM

## 2024-05-13 DIAGNOSIS — Z00.00 ROUTINE GENERAL MEDICAL EXAMINATION AT A HEALTH CARE FACILITY: ICD-10-CM

## 2024-05-13 DIAGNOSIS — I10 ESSENTIAL (PRIMARY) HYPERTENSION: Primary | ICD-10-CM

## 2024-05-13 PROBLEM — J20.9 ACUTE BRONCHITIS: Status: RESOLVED | Noted: 2024-02-22 | Resolved: 2024-05-13

## 2024-05-13 LAB
ALBUMIN SERPL-MCNC: 4.7 G/DL (ref 3.5–5.2)
ALBUMIN/GLOB SERPL: 1.8 G/DL
ALP SERPL-CCNC: 90 U/L (ref 39–117)
ALT SERPL W P-5'-P-CCNC: 21 U/L (ref 1–33)
ANION GAP SERPL CALCULATED.3IONS-SCNC: 12 MMOL/L (ref 5–15)
AST SERPL-CCNC: 15 U/L (ref 1–32)
BASOPHILS # BLD AUTO: 0.05 10*3/MM3 (ref 0–0.2)
BASOPHILS NFR BLD AUTO: 0.6 % (ref 0–1.5)
BILIRUB SERPL-MCNC: 0.2 MG/DL (ref 0–1.2)
BUN SERPL-MCNC: 13 MG/DL (ref 6–20)
BUN/CREAT SERPL: 20.6 (ref 7–25)
CALCIUM SPEC-SCNC: 9.4 MG/DL (ref 8.6–10.5)
CHLORIDE SERPL-SCNC: 101 MMOL/L (ref 98–107)
CHOLEST SERPL-MCNC: 262 MG/DL (ref 0–200)
CO2 SERPL-SCNC: 27 MMOL/L (ref 22–29)
CREAT SERPL-MCNC: 0.63 MG/DL (ref 0.57–1)
DEPRECATED RDW RBC AUTO: 43.2 FL (ref 37–54)
EGFRCR SERPLBLD CKD-EPI 2021: 106.9 ML/MIN/1.73
EOSINOPHIL # BLD AUTO: 0.31 10*3/MM3 (ref 0–0.4)
EOSINOPHIL NFR BLD AUTO: 3.7 % (ref 0.3–6.2)
ERYTHROCYTE [DISTWIDTH] IN BLOOD BY AUTOMATED COUNT: 12.7 % (ref 12.3–15.4)
GLOBULIN UR ELPH-MCNC: 2.6 GM/DL
GLUCOSE SERPL-MCNC: 104 MG/DL (ref 65–99)
HCT VFR BLD AUTO: 43.1 % (ref 34–46.6)
HDLC SERPL-MCNC: 74 MG/DL (ref 40–60)
HGB BLD-MCNC: 14.1 G/DL (ref 12–15.9)
IMM GRANULOCYTES # BLD AUTO: 0.03 10*3/MM3 (ref 0–0.05)
IMM GRANULOCYTES NFR BLD AUTO: 0.4 % (ref 0–0.5)
LDLC SERPL CALC-MCNC: 155 MG/DL (ref 0–100)
LDLC/HDLC SERPL: 2.04 {RATIO}
LYMPHOCYTES # BLD AUTO: 2.01 10*3/MM3 (ref 0.7–3.1)
LYMPHOCYTES NFR BLD AUTO: 24.3 % (ref 19.6–45.3)
MAGNESIUM SERPL-MCNC: 1.9 MG/DL (ref 1.6–2.6)
MCH RBC QN AUTO: 29.8 PG (ref 26.6–33)
MCHC RBC AUTO-ENTMCNC: 32.7 G/DL (ref 31.5–35.7)
MCV RBC AUTO: 91.1 FL (ref 79–97)
MONOCYTES # BLD AUTO: 0.72 10*3/MM3 (ref 0.1–0.9)
MONOCYTES NFR BLD AUTO: 8.7 % (ref 5–12)
NEUTROPHILS NFR BLD AUTO: 5.15 10*3/MM3 (ref 1.7–7)
NEUTROPHILS NFR BLD AUTO: 62.3 % (ref 42.7–76)
PLATELET # BLD AUTO: 319 10*3/MM3 (ref 140–450)
PMV BLD AUTO: 9.2 FL (ref 6–12)
POTASSIUM SERPL-SCNC: 4.6 MMOL/L (ref 3.5–5.2)
PROT SERPL-MCNC: 7.3 G/DL (ref 6–8.5)
RBC # BLD AUTO: 4.73 10*6/MM3 (ref 3.77–5.28)
SODIUM SERPL-SCNC: 140 MMOL/L (ref 136–145)
TRIGL SERPL-MCNC: 186 MG/DL (ref 0–150)
TSH SERPL DL<=0.05 MIU/L-ACNC: 0.89 UIU/ML (ref 0.27–4.2)
VLDLC SERPL-MCNC: 33 MG/DL (ref 5–40)
WBC NRBC COR # BLD AUTO: 8.27 10*3/MM3 (ref 3.4–10.8)

## 2024-05-13 PROCEDURE — 80061 LIPID PANEL: CPT

## 2024-05-13 PROCEDURE — 99396 PREV VISIT EST AGE 40-64: CPT | Performed by: NURSE PRACTITIONER

## 2024-05-13 PROCEDURE — 36415 COLL VENOUS BLD VENIPUNCTURE: CPT

## 2024-05-13 PROCEDURE — 80050 GENERAL HEALTH PANEL: CPT

## 2024-05-13 PROCEDURE — 83735 ASSAY OF MAGNESIUM: CPT

## 2024-05-13 RX ORDER — LISINOPRIL 10 MG/1
10 TABLET ORAL DAILY
Qty: 90 TABLET | Refills: 0 | Status: SHIPPED | OUTPATIENT
Start: 2024-05-13

## 2024-05-13 NOTE — PROGRESS NOTES
Chief Complaint  Annual Exam    Subjective          Kiley Valentine presents to [facility]    History of Present Illness  General Health Questions  Regular exercise as least 3 days a week: yes   Follows a healthy diet: yes   Wears seat belt always: yes   Drinks Alcohol: socially   Uses Recreational drugs: none   Uses tobacco products: none  UTD on Tetanus vaccine: thinks over 10 years   Does BSE:yes   Last mammogram: years ago   Last colon screen:never       Hypertension:  Current medication: lisinopril   Tolerating Medication: Yes  Checking BP at home and it is: 130/80's usually   Needs refills: Yes to medica   Labs:  Lab Results       Component                Value               Date                       GLUCOSE                  111 (H)             10/11/2023                 BUN                      11                  10/11/2023                 CREATININE               0.68                10/11/2023                 EGFRIFNONA               67                  09/09/2021                 BCR                      16.2                10/11/2023                 K                        4.6                 10/11/2023                 CO2                      30.0 (H)            10/11/2023                 CALCIUM                  9.8                 10/11/2023                 ALBUMIN                  4.5                 10/11/2023                 LABIL2                   1.8                 06/24/2020                 AST                      19                  10/11/2023                 ALT                      22                  10/11/2023              PAST MEDICAL HISTORY   Changes since 10-:         SCC behind right ear was removed in 2022     GYNECOLOGICAL HISTORY:    Contraception: S/P tubal ligation;      Hospitalizations:      Asthma admitted once on 2002         Surgical History:         Tubal Ligation Procedures: Prior gynecologic procedures include LEEP procedure 2000.       Hysterectomy: 17; lap hysterectomy, bilateral tubes/cystoscopy /benign/fibroids;     Procedures: hysteroscopy/endometrial biopsy          Family History:       Father: Hypertension;  Cerebrovascular Accident     Mother: Hypertension;  Type 2 Diabetes     Brother(s): 0 brother(s) total     Sister(s): 0 sister(s) total     Paternal Grandfather: COPD     Paternal Grandmother: Hypertension         Social History:       Occupation: [employer]    Marital Status:       Children: 3 children                   Past Medical History:   Diagnosis Date    Acute bronchitis, unspecified     Acute URI     Asthma     Breast mass     Depression with anxiety     Essential (primary) hypertension     History of noncompliance with medical treatment     Mild intermittent asthma with (acute) exacerbation     Mixed hyperlipidemia     Other specified disorders of the skin and subcutaneous tissue     Persistent mood (affective) disorder, unspecified     Solitary pulmonary nodule        No Known Allergies     Past Surgical History:   Procedure Laterality Date    CYSTOSCOPY      BENIGN  FIBROIDS    ENDOMETRIAL BIOPSY  2016    HYSTERECTOMY  2016    LAP    LEEP  2000    TUBAL ABDOMINAL LIGATION Bilateral         Social History     Tobacco Use    Smoking status: Former     Current packs/day: 0.00     Types: Cigarettes     Quit date:      Years since quittin.3     Passive exposure: Never    Smokeless tobacco: Never   Substance Use Topics    Alcohol use: Not Currently     Comment: QUIT 7/15       Family History   Problem Relation Age of Onset    Hypertension Mother     Diabetes type II Mother     Hypertension Father     Stroke Father     Hypertension Paternal Grandmother     COPD Paternal Grandfather         Health Maintenance Due   Topic Date Due    MAMMOGRAM  Never done    COLORECTAL CANCER SCREENING  Never done    Pneumococcal Vaccine 0-64 (1 of 2 - PCV) Never done    TDAP/TD VACCINES (1 -  "Tdap) Never done    ZOSTER VACCINE (1 of 2) Never done    COVID-19 Vaccine (3 - 2023-24 season) 09/01/2023        Current Outpatient Medications on File Prior to Visit   Medication Sig    albuterol sulfate HFA (Proventil HFA) 108 (90 Base) MCG/ACT inhaler Inhale 2 puffs Every 4 (Four) Hours As Needed for Wheezing.    [DISCONTINUED] lisinopril (PRINIVIL,ZESTRIL) 10 MG tablet TAKE ONE TABLET BY MOUTH EVERY MORNING PLEASE COMPLETE LABS PRIOR TO APPT 10/11/23    [DISCONTINUED] azithromycin (ZITHROMAX) 250 MG tablet Take 2 tablets PO the first day, then 1 tablet PO daily for 4 days.     No current facility-administered medications on file prior to visit.       Immunization History   Administered Date(s) Administered    COVID-19 (PFIZER) Purple Cap Monovalent 07/11/2021, 08/01/2021    Fluzone (or Fluarix & Flulaval for VFC) >6mos 09/29/2023    Hepatitis A 05/05/2018, 11/12/2018    Influenza Injectable Mdck Pf Quad 09/27/2021, 09/23/2022    Influenza, Unspecified 10/22/2019, 09/27/2021, 09/23/2022       Review of Systems   Constitutional:  Negative for fatigue and fever.   HENT:  Negative for ear pain and sore throat.    Eyes:  Negative for blurred vision.   Respiratory:  Negative for cough and shortness of breath.    Cardiovascular:  Negative for chest pain, palpitations and leg swelling.   Gastrointestinal:  Negative for abdominal pain, constipation, diarrhea, nausea and vomiting.   Musculoskeletal:  Positive for myalgias (leg cramps last night). Negative for arthralgias.   Skin:  Negative for rash.   Neurological:  Negative for dizziness, weakness, numbness and headache.   Psychiatric/Behavioral:  Positive for sleep disturbance (last night did not sleep due to leg cramps). Negative for depressed mood.         Objective     Vitals:    05/13/24 1040 05/13/24 1052   BP: 158/91 148/95   BP Location: Left arm Left arm   Patient Position: Sitting Sitting   Pulse: 80 76   Weight: 76.7 kg (169 lb)    Height: 166.4 cm (65.5\")  "            Physical Exam  Vitals reviewed.   Constitutional:       General: She is not in acute distress.     Appearance: Normal appearance. She is well-developed.   HENT:      Head: Normocephalic and atraumatic. Hair is normal.      Right Ear: Hearing, tympanic membrane, ear canal and external ear normal. No decreased hearing noted. No drainage.      Left Ear: Hearing, tympanic membrane, ear canal and external ear normal. No decreased hearing noted.      Nose: Nose normal. No nasal deformity.      Mouth/Throat:      Mouth: Mucous membranes are moist.      Pharynx: No oropharyngeal exudate.   Eyes:      General: Lids are normal.      Extraocular Movements: Extraocular movements intact.      Conjunctiva/sclera: Conjunctivae normal.      Pupils: Pupils are equal, round, and reactive to light.   Neck:      Thyroid: No thyromegaly.      Vascular: No carotid bruit or JVD.   Cardiovascular:      Rate and Rhythm: Normal rate and regular rhythm.      Pulses: Normal pulses.      Heart sounds: Normal heart sounds. No murmur heard.     No friction rub. No gallop.   Pulmonary:      Effort: Pulmonary effort is normal. No respiratory distress.      Breath sounds: Normal breath sounds. No wheezing or rhonchi.   Chest:   Breasts:     Right: Normal. No mass, nipple discharge or skin change.      Left: Normal. No mass, nipple discharge or skin change.   Abdominal:      General: Bowel sounds are normal. There is no distension.      Palpations: Abdomen is soft. There is no mass.      Tenderness: There is no abdominal tenderness.      Comments:       Musculoskeletal:         General: No tenderness or deformity. Normal range of motion.      Cervical back: Normal range of motion and neck supple.   Lymphadenopathy:      Cervical: No cervical adenopathy.      Upper Body:      Right upper body: No axillary adenopathy.      Left upper body: No axillary adenopathy.   Skin:     General: Skin is warm and dry.      Findings: No erythema or rash.    Neurological:      Mental Status: She is alert and oriented to person, place, and time.      Cranial Nerves: No cranial nerve deficit.      Motor: No abnormal muscle tone.      Gait: Gait normal.      Deep Tendon Reflexes: Reflexes are normal and symmetric.   Psychiatric:         Mood and Affect: Mood and affect normal.         Behavior: Behavior normal.         Thought Content: Thought content normal.         Judgment: Judgment normal.         Result Review :     The following data was reviewed by: ABNER La on 05/13/2024:                       Assessment and Plan      Diagnoses and all orders for this visit:    1. Essential (primary) hypertension (Primary)  Assessment & Plan:  Repeat BP still up, checking labs today, she had coffee, milk and cheese it crackers this am. Continue to monitor BP at home. Continue current med. Continue to modify diet and lifestyle. Sent to lab     Orders:  -     Comprehensive Metabolic Panel; Future  -     Lipid Panel; Future  -     lisinopril (PRINIVIL,ZESTRIL) 10 MG tablet; Take 1 tablet by mouth Daily.  Dispense: 90 tablet; Refill: 0    2. Routine general medical examination at a health care facility  Assessment & Plan:  Advise regular exercise, healthy eating, always wear seat belts.  Immunizations discussed, to check on coverage of Tdap.   To continue yearly optometry and dental exams.        Orders:  -     Comprehensive Metabolic Panel; Future  -     CBC & Differential; Future  -     TSH; Future  -     Lipid Panel; Future    3. Screening mammogram for breast cancer  Assessment & Plan:  continue monthly BSE, setting up a screening mammogram     Orders:  -     Mammo Screening Digital Tomosynthesis Bilateral With CAD; Future    4. Screen for colon cancer  Assessment & Plan:  Discussed screening for colon cancer, declines today, will consider       5. Leg cramps  Assessment & Plan:  Checking some labs, make sure to drink adequate water daily    Orders:  -      Magnesium; Future                  Follow Up     Return for followup pending lab results.    Patient was given instructions and counseling regarding her condition or for health maintenance advice. Please see specific information pulled into the AVS if appropriate.

## 2024-05-13 NOTE — ASSESSMENT & PLAN NOTE
Repeat BP still up, checking labs today, she had coffee, milk and cheese it crackers this am. Continue to monitor BP at home. Continue current med. Continue to modify diet and lifestyle. Sent to lab

## 2024-05-13 NOTE — ASSESSMENT & PLAN NOTE
Advise regular exercise, healthy eating, always wear seat belts.  Immunizations discussed, to check on coverage of Tdap.   To continue yearly optometry and dental exams.

## 2024-09-25 DIAGNOSIS — I10 ESSENTIAL (PRIMARY) HYPERTENSION: ICD-10-CM

## 2024-09-25 RX ORDER — LISINOPRIL 10 MG/1
10 TABLET ORAL DAILY
Qty: 30 TABLET | Refills: 0 | Status: SHIPPED | OUTPATIENT
Start: 2024-09-25

## 2024-10-22 DIAGNOSIS — I10 ESSENTIAL (PRIMARY) HYPERTENSION: ICD-10-CM

## 2024-10-22 RX ORDER — LISINOPRIL 10 MG/1
10 TABLET ORAL DAILY
Qty: 30 TABLET | Refills: 0 | Status: SHIPPED | OUTPATIENT
Start: 2024-10-22

## 2024-10-22 NOTE — TELEPHONE ENCOUNTER
October 22, 2024  Pia Pedraza APRN to [facility] Regarding result: lisinopril (PRINIVIL,ZESTRIL) 10 MG tablet [Pharmacy Med Name: LISINOPRIL 10 MG TABLET]       10/22/24  4:42 PM  Send in 30 and have her keep appt with me later this month

## 2024-10-22 NOTE — TELEPHONE ENCOUNTER
Rx Refill Note  Requested Prescriptions     Pending Prescriptions Disp Refills    lisinopril (PRINIVIL,ZESTRIL) 10 MG tablet [Pharmacy Med Name: LISINOPRIL 10 MG TABLET] 30 tablet 0     Sig: TAKE 1 TABLET BY MOUTH DAILY      Last office visit with prescribing clinician: 05/13/24, ROSA Pedraza  Last telemedicine visit with prescribing clinician: Visit date not found   Next office visit with prescribing clinician: 10/28/24, ROSA Pedraza  ACE Inhibitors Protocol Failed     Zoë Li LPN  10/22/24, 16:18 EDT

## 2024-11-12 ENCOUNTER — OFFICE VISIT (OUTPATIENT)
Dept: FAMILY MEDICINE CLINIC | Age: 54
End: 2024-11-12
Payer: COMMERCIAL

## 2024-11-12 VITALS
TEMPERATURE: 97.9 F | SYSTOLIC BLOOD PRESSURE: 132 MMHG | WEIGHT: 153.4 LBS | HEART RATE: 75 BPM | HEIGHT: 66 IN | BODY MASS INDEX: 24.65 KG/M2 | DIASTOLIC BLOOD PRESSURE: 84 MMHG

## 2024-11-12 DIAGNOSIS — Z12.11 SCREEN FOR COLON CANCER: ICD-10-CM

## 2024-11-12 DIAGNOSIS — E78.2 MIXED HYPERLIPIDEMIA: ICD-10-CM

## 2024-11-12 DIAGNOSIS — I10 ESSENTIAL (PRIMARY) HYPERTENSION: Primary | ICD-10-CM

## 2024-11-12 PROCEDURE — 99213 OFFICE O/P EST LOW 20 MIN: CPT | Performed by: NURSE PRACTITIONER

## 2024-11-12 NOTE — ASSESSMENT & PLAN NOTE
Reviewed last labs and over the last 5 years, to continue efforts with her diet and regular exercise, to return fasting for labs, may need to start her on crestor 5 mg, risk vs benefit discussed

## 2024-11-12 NOTE — PROGRESS NOTES
Chief Complaint  Hypertension (6 month follow up HTN. )    Subjective          Kiley Valentine presents to [facility]    History of Present Illness  Hypertension:  Current medication: lisinopril   Tolerating Medication: Yes  Checking BP at home and it is: 101-166 (usually 120-130's) 81-98 systolic   Needs refills: Yes to [facility]   Labs:  Lab Results       Component                Value               Date                       GLUCOSE                  104 (H)             05/13/2024                 BUN                      13                  05/13/2024                 CREATININE               0.63                05/13/2024                 EGFRIFNONA               67                  09/09/2021                 BCR                      20.6                05/13/2024                 K                        4.6                 05/13/2024                 CO2                      27.0                05/13/2024                 CALCIUM                  9.4                 05/13/2024                 ALBUMIN                  4.7                 05/13/2024                 LABIL2                   1.8                 06/24/2020                 AST                      15                  05/13/2024                 ALT                      21                  05/13/2024              Hyperlipidemia  Current medication: none     Lab Results       Component                Value               Date                       CHOL                     262 (H)             05/13/2024                 CHLPL                    231 (H)             10/22/2019                 TRIG                     186 (H)             05/13/2024                 HDL                      74 (H)              05/13/2024                 LDL                      155 (H)             05/13/2024                  PAST MEDICAL HISTORY   Changes since 5-:         SCC behind right ear was removed in 2022       GYNECOLOGICAL HISTORY:     Contraception: S/P tubal ligation;      Hospitalizations:       Asthma admitted once on          Surgical History:         Tubal Ligation Procedures: Prior gynecologic procedures include LEEP procedure .      Hysterectomy: 17; lap hysterectomy, bilateral tubes/cystoscopy /benign/fibroids;     Procedures: hysteroscopy/endometrial biopsy          Family History:        Father: Hypertension;  Cerebrovascular Accident     Mother: Hypertension;  Type 2 Diabetes     Brother(s): 0 brother(s) total     Sister(s): 0 sister(s) total     Paternal Grandfather: COPD     Paternal Grandmother: Hypertension         Social History:        Occupation: [email or web address]      Marital Status:       Children: 3 children              Past Medical History:   Diagnosis Date    Acute bronchitis, unspecified     Acute URI     Asthma     Breast mass     Depression with anxiety     Essential (primary) hypertension     History of noncompliance with medical treatment     Mild intermittent asthma with (acute) exacerbation     Mixed hyperlipidemia     Other specified disorders of the skin and subcutaneous tissue     Persistent mood (affective) disorder, unspecified     Solitary pulmonary nodule        No Known Allergies     Past Surgical History:   Procedure Laterality Date    CYSTOSCOPY      BENIGN  FIBROIDS    ENDOMETRIAL BIOPSY  2016    HYSTERECTOMY  2016    LAP    LEEP  2000    TUBAL ABDOMINAL LIGATION Bilateral         Social History     Tobacco Use    Smoking status: Former     Current packs/day: 0.00     Types: Cigarettes     Quit date:      Years since quittin.8     Passive exposure: Never    Smokeless tobacco: Never   Substance Use Topics    Alcohol use: Not Currently     Comment: QUIT 7/15       Family History   Problem Relation Age of Onset    Hypertension Mother     Diabetes type II Mother     Hypertension Father     Stroke Father     Hypertension Paternal Grandmother     COPD Paternal  "Grandfather         Health Maintenance Due   Topic Date Due    COLORECTAL CANCER SCREENING  Never done    TDAP/TD VACCINES (1 - Tdap) Never done    MAMMOGRAM  Never done        Current Outpatient Medications on File Prior to Visit   Medication Sig    albuterol sulfate HFA (Proventil HFA) 108 (90 Base) MCG/ACT inhaler Inhale 2 puffs Every 4 (Four) Hours As Needed for Wheezing.    lisinopril (PRINIVIL,ZESTRIL) 10 MG tablet TAKE 1 TABLET BY MOUTH DAILY     No current facility-administered medications on file prior to visit.       Immunization History   Administered Date(s) Administered    COVID-19 (PFIZER) Purple Cap Monovalent 07/11/2021, 08/01/2021    Fluzone (or Fluarix & Flulaval for VFC) >6mos 09/29/2023    Hepatitis A 05/05/2018, 11/12/2018    Influenza Injectable Mdck Pf Quad 09/27/2021, 09/23/2022    Influenza, Unspecified 10/22/2019, 09/27/2021, 09/23/2022       Review of Systems   Constitutional:  Negative for fatigue and fever.   Respiratory:  Negative for cough and shortness of breath.    Cardiovascular:  Negative for chest pain, palpitations and leg swelling.        Objective     Vitals:    11/12/24 1140 11/12/24 1157   BP: 147/76 132/84   BP Location: Right arm Right arm   Patient Position: Sitting Sitting   Cuff Size: Adult Adult   Pulse: 80 75   Temp: 97.9 °F (36.6 °C)    TempSrc: Oral    Weight: 69.6 kg (153 lb 6.4 oz)    Height: 166.4 cm (65.5\")             Physical Exam  Vitals reviewed.   Constitutional:       General: She is not in acute distress.     Appearance: Normal appearance.   Neck:      Vascular: No carotid bruit.   Cardiovascular:      Rate and Rhythm: Normal rate and regular rhythm.      Heart sounds: Normal heart sounds. No murmur heard.  Pulmonary:      Effort: Pulmonary effort is normal. No respiratory distress.      Breath sounds: Normal breath sounds.   Musculoskeletal:      Right lower leg: No edema.      Left lower leg: No edema.   Neurological:      Mental Status: She is alert. "   Psychiatric:         Mood and Affect: Mood normal.         Behavior: Behavior normal.         Result Review :     The following data was reviewed by: ABNER La on 11/12/2024:                       Assessment and Plan      Diagnoses and all orders for this visit:    1. Essential (primary) hypertension (Primary)  Assessment & Plan:  Hypertension is stable. Continue to monitor BP at home, reviewed her log sheet, new sheet given . Continue current med. Continue to modify diet and lifestyle. She will return for labs   She will get her flu vaccine through work in the near future.     Orders:  -     Comprehensive Metabolic Panel; Future  -     Lipid Panel; Future    2. Mixed hyperlipidemia  Assessment & Plan:   Reviewed last labs and over the last 5 years, to continue efforts with her diet and regular exercise, to return fasting for labs, may need to start her on crestor 5 mg, risk vs benefit discussed     Orders:  -     Comprehensive Metabolic Panel; Future  -     Lipid Panel; Future    3. Screen for colon cancer  Assessment & Plan:  Discussed colon cancer screening today, declines                       Follow Up     Return for fasting for labs.    Patient was given instructions and counseling regarding her condition or for health maintenance advice. Please see specific information pulled into the AVS if appropriate.

## 2024-11-12 NOTE — ASSESSMENT & PLAN NOTE
Hypertension is stable. Continue to monitor BP at home, reviewed her log sheet, new sheet given . Continue current med. Continue to modify diet and lifestyle. She will return for labs   She will get her flu vaccine through work in the near future.

## 2024-12-02 DIAGNOSIS — I10 ESSENTIAL (PRIMARY) HYPERTENSION: ICD-10-CM

## 2024-12-02 RX ORDER — LISINOPRIL 10 MG/1
10 TABLET ORAL DAILY
Qty: 30 TABLET | Refills: 2 | Status: SHIPPED | OUTPATIENT
Start: 2024-12-02

## 2024-12-02 NOTE — TELEPHONE ENCOUNTER
Rx Refill Note  Requested Prescriptions     Pending Prescriptions Disp Refills    lisinopril (PRINIVIL,ZESTRIL) 10 MG tablet 30 tablet 0     Sig: Take 1 tablet by mouth Daily.      Last office visit with prescribing clinician: 11/12/24  Last telemedicine visit with prescribing clinician: Visit date not found   Next office visit with prescribing clinician: 5/14/25      Paula Carrasco LPN  12/02/24, 12:22 EST

## 2024-12-04 ENCOUNTER — TELEPHONE (OUTPATIENT)
Dept: FAMILY MEDICINE CLINIC | Age: 54
End: 2024-12-04
Payer: COMMERCIAL

## 2025-03-01 DIAGNOSIS — I10 ESSENTIAL (PRIMARY) HYPERTENSION: ICD-10-CM

## 2025-03-03 RX ORDER — LISINOPRIL 10 MG/1
10 TABLET ORAL DAILY
Qty: 30 TABLET | Refills: 1 | Status: SHIPPED | OUTPATIENT
Start: 2025-03-03

## 2025-05-14 ENCOUNTER — LAB (OUTPATIENT)
Dept: LAB | Facility: HOSPITAL | Age: 55
End: 2025-05-14
Payer: COMMERCIAL

## 2025-05-14 ENCOUNTER — OFFICE VISIT (OUTPATIENT)
Dept: FAMILY MEDICINE CLINIC | Age: 55
End: 2025-05-14
Payer: COMMERCIAL

## 2025-05-14 VITALS
OXYGEN SATURATION: 97 % | DIASTOLIC BLOOD PRESSURE: 79 MMHG | WEIGHT: 151 LBS | SYSTOLIC BLOOD PRESSURE: 134 MMHG | TEMPERATURE: 98.1 F | HEIGHT: 66 IN | HEART RATE: 77 BPM | BODY MASS INDEX: 24.27 KG/M2

## 2025-05-14 DIAGNOSIS — Z12.11 SCREEN FOR COLON CANCER: ICD-10-CM

## 2025-05-14 DIAGNOSIS — Z00.00 ROUTINE GENERAL MEDICAL EXAMINATION AT A HEALTH CARE FACILITY: ICD-10-CM

## 2025-05-14 DIAGNOSIS — I10 ESSENTIAL (PRIMARY) HYPERTENSION: ICD-10-CM

## 2025-05-14 DIAGNOSIS — I10 ESSENTIAL (PRIMARY) HYPERTENSION: Primary | ICD-10-CM

## 2025-05-14 DIAGNOSIS — Z12.31 SCREENING MAMMOGRAM FOR BREAST CANCER: ICD-10-CM

## 2025-05-14 DIAGNOSIS — E78.2 MIXED HYPERLIPIDEMIA: ICD-10-CM

## 2025-05-14 LAB
ALBUMIN SERPL-MCNC: 4.3 G/DL (ref 3.5–5.2)
ALBUMIN/GLOB SERPL: 1.5 G/DL
ALP SERPL-CCNC: 88 U/L (ref 39–117)
ALT SERPL W P-5'-P-CCNC: 16 U/L (ref 1–33)
ANION GAP SERPL CALCULATED.3IONS-SCNC: 12 MMOL/L (ref 5–15)
AST SERPL-CCNC: 20 U/L (ref 1–32)
BASOPHILS # BLD AUTO: 0.06 10*3/MM3 (ref 0–0.2)
BASOPHILS NFR BLD AUTO: 0.6 % (ref 0–1.5)
BILIRUB SERPL-MCNC: <0.2 MG/DL (ref 0–1.2)
BUN SERPL-MCNC: 12 MG/DL (ref 6–20)
BUN/CREAT SERPL: 12.2 (ref 7–25)
CALCIUM SPEC-SCNC: 8.9 MG/DL (ref 8.6–10.5)
CHLORIDE SERPL-SCNC: 103 MMOL/L (ref 98–107)
CHOLEST SERPL-MCNC: 237 MG/DL (ref 0–200)
CO2 SERPL-SCNC: 25 MMOL/L (ref 22–29)
CREAT SERPL-MCNC: 0.98 MG/DL (ref 0.57–1)
DEPRECATED RDW RBC AUTO: 38.9 FL (ref 37–54)
EGFRCR SERPLBLD CKD-EPI 2021: 69.2 ML/MIN/1.73
EOSINOPHIL # BLD AUTO: 0.38 10*3/MM3 (ref 0–0.4)
EOSINOPHIL NFR BLD AUTO: 3.8 % (ref 0.3–6.2)
ERYTHROCYTE [DISTWIDTH] IN BLOOD BY AUTOMATED COUNT: 11.7 % (ref 12.3–15.4)
GLOBULIN UR ELPH-MCNC: 2.8 GM/DL
GLUCOSE SERPL-MCNC: 90 MG/DL (ref 65–99)
HCT VFR BLD AUTO: 41.7 % (ref 34–46.6)
HDLC SERPL-MCNC: 52 MG/DL (ref 40–60)
HGB BLD-MCNC: 14 G/DL (ref 12–15.9)
IMM GRANULOCYTES # BLD AUTO: 0.02 10*3/MM3 (ref 0–0.05)
IMM GRANULOCYTES NFR BLD AUTO: 0.2 % (ref 0–0.5)
LDLC SERPL CALC-MCNC: 148 MG/DL (ref 0–100)
LDLC/HDLC SERPL: 2.78 {RATIO}
LYMPHOCYTES # BLD AUTO: 2.29 10*3/MM3 (ref 0.7–3.1)
LYMPHOCYTES NFR BLD AUTO: 22.7 % (ref 19.6–45.3)
MCH RBC QN AUTO: 29.9 PG (ref 26.6–33)
MCHC RBC AUTO-ENTMCNC: 33.6 G/DL (ref 31.5–35.7)
MCV RBC AUTO: 88.9 FL (ref 79–97)
MONOCYTES # BLD AUTO: 0.71 10*3/MM3 (ref 0.1–0.9)
MONOCYTES NFR BLD AUTO: 7 % (ref 5–12)
NEUTROPHILS NFR BLD AUTO: 6.62 10*3/MM3 (ref 1.7–7)
NEUTROPHILS NFR BLD AUTO: 65.7 % (ref 42.7–76)
PLATELET # BLD AUTO: 324 10*3/MM3 (ref 140–450)
PMV BLD AUTO: 8.9 FL (ref 6–12)
POTASSIUM SERPL-SCNC: 4.1 MMOL/L (ref 3.5–5.2)
PROT SERPL-MCNC: 7.1 G/DL (ref 6–8.5)
RBC # BLD AUTO: 4.69 10*6/MM3 (ref 3.77–5.28)
SODIUM SERPL-SCNC: 140 MMOL/L (ref 136–145)
TRIGL SERPL-MCNC: 203 MG/DL (ref 0–150)
TSH SERPL DL<=0.05 MIU/L-ACNC: 0.96 UIU/ML (ref 0.27–4.2)
VLDLC SERPL-MCNC: 37 MG/DL (ref 5–40)
WBC NRBC COR # BLD AUTO: 10.08 10*3/MM3 (ref 3.4–10.8)

## 2025-05-14 PROCEDURE — 80061 LIPID PANEL: CPT

## 2025-05-14 PROCEDURE — 80050 GENERAL HEALTH PANEL: CPT

## 2025-05-14 PROCEDURE — 36415 COLL VENOUS BLD VENIPUNCTURE: CPT

## 2025-05-14 RX ORDER — LISINOPRIL 10 MG/1
10 TABLET ORAL DAILY
Qty: 90 TABLET | Refills: 1 | Status: SHIPPED | OUTPATIENT
Start: 2025-05-14

## 2025-05-14 NOTE — PROGRESS NOTES
Chief Complaint  Annual Exam and follow up HTN    Subjective          Kiley Valentine presents to [facility]    History of Present Illness  General Health Questions  Regular exercise as least 3 days a week:  yes  Follows a healthy diet:  yes  Wears seat belt always:  yes  Drinks Alcohol:  occasional/rare use  Uses Recreational drugs:  yes  Uses tobacco products:  yes  UTD on Tetanus vaccine:  none in epic, prob over 10 years   Does BSE:  yes  Last mammogram:  not in few years at [facility]   Last pap: n/a   Last colon screen:  never   Dental:  due, goes yearly [facility]   Optometry:  UTD, [facility]     Hypertension  Current medication:  lisinopril  Tolerating Medication:  yes  Checking BP at home and it is:  135 or less and 70's   Needs refills:  yes, [facility]   Labs:  Lab Results       Component                Value               Date                       GLUCOSE                  104 (H)             05/13/2024                 BUN                      13                  05/13/2024                 CREATININE               0.63                05/13/2024                 EGFRIFNONA               67                  09/09/2021                 BCR                      20.6                05/13/2024                 K                        4.6                 05/13/2024                 CO2                      27.0                05/13/2024                 CALCIUM                  9.4                 05/13/2024                 ALBUMIN                  4.7                 05/13/2024                 AST                      15                  05/13/2024                 ALT                      21                  05/13/2024              Hyperlipidemia  Current medication: none   Lab Results       Component                Value               Date                       CHOL                     262 (H)             05/13/2024                 CHLPL                    231 (H)              10/22/2019                 TRIG                     186 (H)             05/13/2024                 HDL                      74 (H)              05/13/2024                 LDL                      155 (H)             05/13/2024                  PAST MEDICAL HISTORY   Changes since 11-:         SCC behind right ear was removed in 2022        GYNECOLOGICAL HISTORY:    Contraception: S/P tubal ligation;      Hospitalizations:       Asthma admitted once on 2002         Surgical History:         Tubal Ligation Procedures: Prior gynecologic procedures include LEEP procedure 2000.      Hysterectomy: 8-9-16; lap hysterectomy, bilateral tubes/cystoscopy /benign/fibroids;     Procedures: hysteroscopy/endometrial biopsy 4-2016         Family History:        Father: Hypertension;  Cerebrovascular Accident     Mother: Hypertension;  Type 2 Diabetes     Brother(s): 0 brother(s) total     Sister(s): 0 sister(s) total     Paternal Grandfather: COPD     Paternal Grandmother: Hypertension         Social History:        Occupation: [email or web address]      Marital Status:       Children: 3 children                 Past Medical History:   Diagnosis Date    Acute bronchitis, unspecified     Acute URI     Asthma     Breast mass     Depression with anxiety     Essential (primary) hypertension     History of noncompliance with medical treatment     Mild intermittent asthma with (acute) exacerbation     Mixed hyperlipidemia     Other specified disorders of the skin and subcutaneous tissue     Persistent mood (affective) disorder, unspecified     Solitary pulmonary nodule        No Known Allergies     Past Surgical History:   Procedure Laterality Date    CYSTOSCOPY      BENIGN  FIBROIDS    ENDOMETRIAL BIOPSY  04/2016    HYSTERECTOMY  08/09/2016    LAP    LEEP  2000    TUBAL ABDOMINAL LIGATION Bilateral         Social History     Tobacco Use    Smoking status: Former     Current packs/day: 0.00     Types:  Cigarettes     Quit date:      Years since quittin.3     Passive exposure: Never    Smokeless tobacco: Never   Substance Use Topics    Alcohol use: Not Currently     Comment: QUIT 7/15       Family History   Problem Relation Age of Onset    Hypertension Mother     Diabetes type II Mother     Hypertension Father     Stroke Father     Hypertension Paternal Grandmother     COPD Paternal Grandfather         Health Maintenance Due   Topic Date Due    Pneumococcal Vaccine 50+ (1 of 2 - PCV) Never done    TDAP/TD VACCINES (1 - Tdap) Never done    MAMMOGRAM  Never done    ZOSTER VACCINE (1 of 2) Never done    COVID-19 Vaccine (3 - 2024-25 season) 2024    ANNUAL PHYSICAL  2025    LIPID PANEL  2025        Current Outpatient Medications on File Prior to Visit   Medication Sig    albuterol sulfate HFA (Proventil HFA) 108 (90 Base) MCG/ACT inhaler Inhale 2 puffs Every 4 (Four) Hours As Needed for Wheezing.    [DISCONTINUED] lisinopril (PRINIVIL,ZESTRIL) 10 MG tablet TAKE 1 TABLET BY MOUTH DAILY     No current facility-administered medications on file prior to visit.       Immunization History   Administered Date(s) Administered    COVID-19 (PFIZER) Purple Cap Monovalent 2021, 2021    Fluzone (or Fluarix & Flulaval for VFC) >6mos 2023    Hepatitis A 2018, 2018    Influenza Injectable Mdck Pf Quad 2021, 2022    Influenza, Unspecified 10/22/2019, 2021, 2022       Review of Systems   Constitutional:  Negative for fatigue and fever.   HENT:  Negative for ear pain and sore throat.    Eyes:  Negative for blurred vision.   Respiratory:  Negative for cough and shortness of breath.    Cardiovascular:  Negative for chest pain, palpitations and leg swelling.   Gastrointestinal:  Negative for abdominal pain, constipation, diarrhea, nausea and vomiting.   Musculoskeletal:  Negative for arthralgias and myalgias.   Skin:  Negative for rash.   Neurological:   "Negative for dizziness, weakness and headache.   Psychiatric/Behavioral:  Negative for sleep disturbance and depressed mood.         Objective     Vitals:    05/14/25 1245   BP: 134/79   BP Location: Right arm   Patient Position: Sitting   Cuff Size: Adult   Pulse: 77   Temp: 98.1 °F (36.7 °C)   TempSrc: Oral   SpO2: 97%   Weight: 68.5 kg (151 lb)   Height: 166.4 cm (65.5\")     Waist Circumference: 88.9 cm (2' 11\")      Physical Exam  Vitals reviewed.   Constitutional:       General: She is not in acute distress.     Appearance: Normal appearance. She is well-developed.   HENT:      Head: Normocephalic and atraumatic. Hair is normal.      Right Ear: Hearing, tympanic membrane, ear canal and external ear normal. No decreased hearing noted. No drainage.      Left Ear: Hearing, tympanic membrane, ear canal and external ear normal. No decreased hearing noted.      Nose: Nose normal. No nasal deformity.      Mouth/Throat:      Mouth: Mucous membranes are moist.      Pharynx: No oropharyngeal exudate.   Eyes:      General: Lids are normal.      Extraocular Movements: Extraocular movements intact.      Conjunctiva/sclera: Conjunctivae normal.      Pupils: Pupils are equal, round, and reactive to light.   Neck:      Thyroid: No thyromegaly.      Vascular: No carotid bruit or JVD.   Cardiovascular:      Rate and Rhythm: Normal rate and regular rhythm.      Pulses: Normal pulses.      Heart sounds: Normal heart sounds. No murmur heard.     No friction rub. No gallop.   Pulmonary:      Effort: Pulmonary effort is normal. No respiratory distress.      Breath sounds: Normal breath sounds. No wheezing or rhonchi.   Chest:   Breasts:     Right: Normal. No mass, nipple discharge or skin change.      Left: Normal. No mass, nipple discharge or skin change.   Abdominal:      General: Bowel sounds are normal. There is no distension.      Palpations: Abdomen is soft. There is no mass.      Tenderness: There is no abdominal tenderness. "      Comments:       Musculoskeletal:         General: No tenderness or deformity. Normal range of motion.      Cervical back: Normal range of motion and neck supple.   Lymphadenopathy:      Cervical: No cervical adenopathy.      Upper Body:      Right upper body: No axillary adenopathy.      Left upper body: No axillary adenopathy.   Skin:     General: Skin is warm and dry.      Findings: No erythema or rash.   Neurological:      Mental Status: She is alert and oriented to person, place, and time.      Cranial Nerves: No cranial nerve deficit.      Motor: No abnormal muscle tone.      Gait: Gait normal.      Deep Tendon Reflexes: Reflexes are normal and symmetric.   Psychiatric:         Mood and Affect: Mood and affect normal.         Behavior: Behavior normal.         Thought Content: Thought content normal.         Judgment: Judgment normal.         Result Review :     The following data was reviewed by: ABNER La on 05/14/2025:                       Assessment and Plan      Assessment & Plan  Essential (primary) hypertension    Hypertension is stable. Continue to monitor BP at home. Continue current meds. Continue to modify diet and lifestyle.  Had lasagna and coffee today, went ahead and sent to the lab.      Orders:    Comprehensive Metabolic Panel; Future    Lipid Panel; Future    lisinopril (PRINIVIL,ZESTRIL) 10 MG tablet; Take 1 tablet by mouth Daily.    Routine general medical examination at a health care facility  Advise regular exercise, healthy eating, always wear seat belts. L fall   Immunizations discussed, advised to check with her pharmacy on coverage of Tdap and Shingrex.   To continue yearly optometry and dental exams.      Orders:    Comprehensive Metabolic Panel; Future    CBC & Differential; Future    TSH; Future    Lipid Panel; Future    Screening mammogram for breast cancer  continue BSE, placed order, pt will schedule her own mammogram   Orders:    Mammo Screening Digital  Tomosynthesis Bilateral With CAD; Future    Screen for colon cancer  Discussed, will consider, declines any screening today                              Follow Up     Return for followup pending lab results.    Patient was given instructions and counseling regarding her condition or for health maintenance advice. Please see specific information pulled into the AVS if appropriate.